# Patient Record
Sex: FEMALE | Race: WHITE | NOT HISPANIC OR LATINO | Employment: FULL TIME | URBAN - METROPOLITAN AREA
[De-identification: names, ages, dates, MRNs, and addresses within clinical notes are randomized per-mention and may not be internally consistent; named-entity substitution may affect disease eponyms.]

---

## 2019-03-13 ENCOUNTER — HOSPITAL ENCOUNTER (OUTPATIENT)
Dept: GENERAL RADIOLOGY | Facility: HOSPITAL | Age: 44
Discharge: HOME | End: 2019-03-13
Attending: NURSE PRACTITIONER

## 2021-03-10 ENCOUNTER — TELEMEDICINE CONVERTED (OUTPATIENT)
Dept: NEUROLOGY | Facility: CLINIC | Age: 46
End: 2021-03-10
Attending: NURSE PRACTITIONER

## 2021-05-10 NOTE — H&P
History and Physical      Patient Name: Marybeth Harrison   Patient ID: [id number]   Sex: Female   YOB: 1975    Referring Provider: Sully HARDY    Visit Date: March 10, 2021    Provider: PADMINI Borjas   Location: [facility]   Location Address: [address] [place] [address]  [id number]   Location Phone: [phone]          Chief Complaint  · Migraines      History Of Present Illness  Marybeth Harrison is a 46 year old /White female who presents today to [facility] today referred from Sully HARDY for evaluation of headaches.   She reports that she developed headaches many years ago. Since that time, her headaches have progressively worsened.   Currently, she reports headaches that are located holocranial. She characterizes the headaches as 8/10 in severity, throbbing in nature with associated photophobia, phonophobia, and nausea. Her headaches last 8-10 hours. She reports 4 headache days per month. She denies associated aura. She denies focal numbness, weakness, speech, and vision changes.   Triggers: Weather   Symptoms improved by: Dark quiet room and Sleep   She states she is sleeping fairly well. Reports getting 6-7 hours of sleep per night. Denies snoring. Reports refreshing sleep.   Prior prophylactic medications include: Trokendi XR, Viibryd, amitriptyline, Inderal, Emgality   She uses abortive therapy such as: Imitrex, Maxalt, Zomig, ibuprofen, aleve, tylenol   Caffeine Use: none   Independently Reviewed: INDEPENDENTLY REVIEWED WHAT   Childbearing potential : menopausal   History of Kidney Stones: No   She denies a family history of cerebral aneurysm.   Video Conferencing Visit  Marybeth Harrison is a 46 year old /White female who is presenting for evaluation via video conferencing via [employer]. Verbal consent obtained before beginning visit.   The following staff were present during this visit: LORY Napoles       Prior to Emgality samples from PCP, was having 25+ migraine days per month.  Has had a very significant reduction with Emgality.       Past Medical History  miagraine; Panic Disorder         Past Surgical History  Colonoscopy; Septoplasty; Turbinate Reduction         Medication List  clonazepam 0.5 mg Oral Tablet; Viibryd 10 mg oral tablet; Zomig 5 mg oral tablet         Allergy List  NO KNOWN DRUG ALLERGIES       Allergies Reconciled  Family Medical History  - No Family History of Colorectal Cancer         Social History  Alcohol (Former); Tobacco (Never)         Review of Systems  · Constitutional  o Denies  o : chills, excessive sweating, fatigue, fever, sycope/passing out, weight gain, weight loss  · Eyes  o Denies  o : changes in vision, blurry vision, double vision  · HENT  o Admits  o : headaches  o Denies  o : loss of hearing, ringing in the ears, ear aches, sore throat, nasal congestion, sinus pain, nose bleeds, seasonal allergies  · Cardiovascular  o Denies  o : blood clots, swollen legs, anemia, easy burising or bleeding, transfusions  · Respiratory  o Denies  o : shortness of breath, dry cough, productive cough, pneumonia, COPD  · Gastrointestinal  o Denies  o : difficulty swallowing, reflux  · Genitourinary  o Denies  o : incontinence  · Neurologic  o Admits  o : headache  o Denies  o : seizure, stroke, tremor, loss of balance, falls, dizziness/vertigo, difficulty with sleep, numbness/tingling/paresthesia , difficulty with coordination, difficulty with dexterity, weakness  · Musculoskeletal  o Denies  o : neck stiffness/pain, swollen lymph nodes, muscle aches, joint pain, weakness, spasms, sciatica, pain radiating in arm, pain radiating in leg, low back pain  · Endocrine  o Denies  o : diabetes, thyroid disorder  · Psychiatric  o Denies  o : anxiety, depression      Physical Examination  · Constitutional  o Appearance  o : well-nourished, well groomed, in no apparent distress  · Eyes  o Pupils and  Irises  o : Pupils equal, round, and reactive to light and accommodation bilaterally  · Respiratory  o Auscultation of Lungs  o : Lungs were clear to ascultation bilaterally. No wheezes, rhonchi or rales were appreciated.  · Cardiovascular  o Heart  o :   § Auscultation of Heart  § : Regular rate and rhythm, no murmurs, gallops or rubs were appreciated.  o Peripheral Vascular System  o :   § Extremities  § : No peripheral edema was appreciated  · Musculoskeletal  o General  o : Normal bulk and normal tone.  · Neurologic  o Mental Status Examination  o :   § Orientation  § : Alert and oriented to person, place, and time,  § Speech/Language  § : Intact naming, comprehension, and repetition. No dysarthria.  § Memory  § : Immediate recall is 3/3. Recall at 5 minutes is 3/3.   § Attention  § : Attention span is intact to serial 7 examination and finger tapping.   § Fund of Knowledge  § : Adequate fund of knowledge  o Cranial Nerves  o : Pupils are equal, round and reactive to light. Extraocular movements are intact. Visual fields are full. Fundoscopic examination reveals sharp disc bilaterally. Sensation in the V1-V3 distribution is intact and symmetric. Muscles of mastication are strong and symmetric. Muscles of facial expression are strong and symmetric. Hearing is intact. Palatal raise is intact and symmetric. Uvula is midline. Shoulder shrug is strong. Tongue protrudes in the midline.  o Motor Examination  o :   § RUE Strength  § : strength normal  § LUE Strength  § : strength normal  § RLE Strength  § : strength normal  § LLE Strength  § : strength normal  o Reflexes  o : 2+ reflexes throughout and symmetric. Negative Hall. Negative Babinski.   o Sensation  o : Intact sensation to light touch, pinprick, vibration and proprioception throughout.  o Gait and Station  o :   § Gait Screening  § : Normal, narrow based gait, with a normal arm swing.  o Coordination  o : Intact finger to nose and heel to shin. Rapid  alternating movements are intact in the upper and lower extremities.          Assessment  · Intractable migraine without aura and without status migrainosus     346.11/G43.019  Will continue Emgality for preventative therapy of migraine and Nurtec PRN for abortive therapy.       Plan  · Medications  o Emgality Pen 120 mg/mL subcutaneous pen injector   SIG: inject 1 milliliter (120 mg) by subcutaneous route once a month in the abdomen, thigh, outer upper arm, or buttocks for 30 days   DISP: (1) Milliliter with 5 refills  Prescribed on 03/10/2021     o Nurtec ODT 75 mg oral tablet,disintegrating   SIG: Dissolve 1 tablet on top of tongue then swallow. Max 1 tablet/day   DISP: (8) Tablet with 3 refills  Prescribed on 03/10/2021     o Medications have been Reconciled  o Transition of Care or Provider Policy  · Instructions  o Encouraged to follow-up with Primary Care Provider for preventative care.  o Call or Return if symptoms worsen or persist.  o Follow up in 3 months.  o Total time spent with patient and coordinating patient care was 45 minutes  · Disposition  o Call or Return if symptoms worsen or persist.            Electronically Signed by: Kassandra Moyer APRN -Author on March 10, 2021 03:22:32 PM

## 2021-06-10 ENCOUNTER — OFFICE VISIT (OUTPATIENT)
Dept: NEUROLOGY | Facility: CLINIC | Age: 46
End: 2021-06-10

## 2021-06-10 VITALS
HEIGHT: 63 IN | SYSTOLIC BLOOD PRESSURE: 121 MMHG | DIASTOLIC BLOOD PRESSURE: 79 MMHG | TEMPERATURE: 97.8 F | HEART RATE: 88 BPM | WEIGHT: 145.5 LBS | BODY MASS INDEX: 25.78 KG/M2

## 2021-06-10 DIAGNOSIS — G43.019 INTRACTABLE MIGRAINE WITHOUT AURA AND WITHOUT STATUS MIGRAINOSUS: Primary | ICD-10-CM

## 2021-06-10 PROCEDURE — 99214 OFFICE O/P EST MOD 30 MIN: CPT | Performed by: NURSE PRACTITIONER

## 2021-06-10 RX ORDER — RIMEGEPANT SULFATE 75 MG/75MG
TABLET, ORALLY DISINTEGRATING ORAL
COMMUNITY
Start: 2021-03-10 | End: 2021-06-10

## 2021-06-10 RX ORDER — GALCANEZUMAB 120 MG/ML
INJECTION, SOLUTION SUBCUTANEOUS
COMMUNITY
Start: 2021-05-09 | End: 2021-06-10 | Stop reason: SDUPTHER

## 2021-06-10 RX ORDER — GALCANEZUMAB 120 MG/ML
120 INJECTION, SOLUTION SUBCUTANEOUS ONCE
Qty: 1 PEN | Refills: 3 | Status: SHIPPED | OUTPATIENT
Start: 2021-06-10 | End: 2021-06-10

## 2021-06-10 RX ORDER — ZOLMITRIPTAN 5 MG/1
SPRAY NASAL
COMMUNITY
Start: 2021-05-25 | End: 2021-06-10 | Stop reason: SDUPTHER

## 2021-06-10 RX ORDER — ZOLMITRIPTAN 5 MG/1
1 SPRAY NASAL AS NEEDED
Qty: 12 EACH | Refills: 3 | Status: SHIPPED | OUTPATIENT
Start: 2021-06-10 | End: 2021-10-12 | Stop reason: SDUPTHER

## 2021-06-10 RX ORDER — VILAZODONE HYDROCHLORIDE 10 MG/1
20 TABLET ORAL DAILY
COMMUNITY

## 2021-06-10 RX ORDER — CLONAZEPAM 1 MG/1
1 TABLET ORAL 2 TIMES DAILY PRN
COMMUNITY
Start: 2021-04-24

## 2021-06-10 NOTE — ASSESSMENT & PLAN NOTE
Will continue Emgality for preventative therapy of migraine.  Continue Zomig and start Ubrelvy for abortive therapy of migraine.

## 2021-06-10 NOTE — PROGRESS NOTES
"Chief Complaint  Migraine and Follow-up    Subjective          Marybeth Harrison presents to [facility]  States she's doing very well on Emgality for preventative therapy of migraine.  Having less than 4 migraine days per month.  Denies side effects.  However, Nurtec isn't effective abortive therapy.  States that Zomig is effective as an abortive therapy but causes her drowsiness and she is not comfortable taking if she is at work.        Objective   Vital Signs:   /79 (BP Location: Left arm, Patient Position: Sitting)   Pulse 88   Temp 97.8 °F (36.6 °C)   Ht 160 cm (63\")   Wt 66 kg (145 lb 8 oz)   BMI 25.77 kg/m²     Physical Exam  HENT:      Head: Normocephalic.   Pulmonary:      Effort: Pulmonary effort is normal.   Neurological:      Mental Status: She is alert and oriented to person, place, and time.      Cranial Nerves: Cranial nerves are intact.      Sensory: Sensation is intact.      Motor: Motor function is intact.      Coordination: Coordination is intact.      Deep Tendon Reflexes: Reflexes are normal and symmetric.        Neurologic Exam     Mental Status   Oriented to person, place, and time.        Result Review :                 Assessment and Plan    Diagnoses and all orders for this visit:    1. Intractable migraine without aura and without status migrainosus (Primary)  Assessment & Plan:  Will continue Emgality for preventative therapy of migraine.  Continue Zomig and start Ubrelvy for abortive therapy of migraine.         Other orders  -     Emgality 120 MG/ML auto-injector pen; Inject 1 mL under the skin into the appropriate area as directed 1 (One) Time for 1 dose.  Dispense: 1 pen; Refill: 3  -     ZOLMitriptan (ZOMIG) 5 MG nasal solution; 1 spray into the nostril(s) as directed by provider As Needed for Migraine.  Dispense: 12 each; Refill: 3  -     ubrogepant (ubrogepant) 100 MG tablet; One tab at HA onset. May repeat once in 2 hrs if " needed  Dispense: 10 tablet; Refill: 3      Follow Up   Return in about 4 months (around 10/10/2021) for migraine.  Patient was given instructions and counseling regarding her condition or for health maintenance advice. Please see specific information pulled into the AVS if appropriate.

## 2021-10-12 ENCOUNTER — OFFICE VISIT (OUTPATIENT)
Dept: NEUROLOGY | Facility: CLINIC | Age: 46
End: 2021-10-12

## 2021-10-12 VITALS
BODY MASS INDEX: 26.26 KG/M2 | HEIGHT: 63 IN | DIASTOLIC BLOOD PRESSURE: 84 MMHG | HEART RATE: 108 BPM | SYSTOLIC BLOOD PRESSURE: 133 MMHG | WEIGHT: 148.2 LBS

## 2021-10-12 DIAGNOSIS — G43.019 INTRACTABLE MIGRAINE WITHOUT AURA AND WITHOUT STATUS MIGRAINOSUS: Primary | ICD-10-CM

## 2021-10-12 PROCEDURE — 99213 OFFICE O/P EST LOW 20 MIN: CPT | Performed by: NURSE PRACTITIONER

## 2021-10-12 RX ORDER — GALCANEZUMAB 120 MG/ML
120 INJECTION, SOLUTION SUBCUTANEOUS ONCE
Qty: 1 PEN | Refills: 3 | Status: SHIPPED | OUTPATIENT
Start: 2021-10-12 | End: 2021-10-12

## 2021-10-12 RX ORDER — ZOLMITRIPTAN 5 MG/1
1 SPRAY NASAL AS NEEDED
Qty: 12 EACH | Refills: 3 | Status: SHIPPED | OUTPATIENT
Start: 2021-10-12 | End: 2022-02-15 | Stop reason: SDUPTHER

## 2021-10-12 NOTE — PROGRESS NOTES
"Chief Complaint  Migraine (emgality)    Subjective          Marybeth Harrison presents to [facility]ERY  Headaches have decreased to about 6 headache days per month with the Emgality.  Zomig is effective abortive therapy.  Doing well.  Denies side effect.       Objective   Vital Signs:   /84   Pulse 108   Ht 160 cm (63\")   Wt 67.2 kg (148 lb 3.2 oz)   BMI 26.25 kg/m²     Physical Exam  HENT:      Head: Normocephalic.   Pulmonary:      Effort: Pulmonary effort is normal.   Neurological:      Mental Status: She is alert and oriented to person, place, and time.      Cranial Nerves: Cranial nerves are intact.      Sensory: Sensation is intact.      Motor: Motor function is intact.      Coordination: Coordination is intact.      Deep Tendon Reflexes: Reflexes are normal and symmetric.        Neurologic Exam     Mental Status   Oriented to person, place, and time.        Result Review :               Assessment and Plan    Diagnoses and all orders for this visit:    1. Intractable migraine without aura and without status migrainosus (Primary)  Assessment & Plan:  Continue emgality for preventative therapy of migraine and Zomig PRN for abortive therapy.         Other orders  -     ZOLMitriptan (ZOMIG) 5 MG nasal solution; 1 spray into the nostril(s) as directed by provider As Needed for Migraine.  Dispense: 12 each; Refill: 3  -     galcanezumab-gnlm (Emgality) 120 MG/ML auto-injector pen; Inject 1 mL under the skin into the appropriate area as directed 1 (One) Time for 1 dose.  Dispense: 1 pen; Refill: 3      Follow Up   Return in about 4 months (around 2/12/2022) for Migraine f/u.  Patient was given instructions and counseling regarding her condition or for health maintenance advice. Please see specific information pulled into the AVS if appropriate.       "

## 2022-01-10 ENCOUNTER — HOSPITAL ENCOUNTER (EMERGENCY)
Facility: HOSPITAL | Age: 47
Discharge: HOME | End: 2022-01-10
Attending: EMERGENCY MEDICINE | Admitting: EMERGENCY MEDICINE

## 2022-01-10 VITALS
TEMPERATURE: 98.3 F | WEIGHT: 147.49 LBS | BODY MASS INDEX: 27.14 KG/M2 | HEART RATE: 97 BPM | OXYGEN SATURATION: 97 % | HEIGHT: 62 IN | DIASTOLIC BLOOD PRESSURE: 97 MMHG | RESPIRATION RATE: 18 BRPM | SYSTOLIC BLOOD PRESSURE: 134 MMHG

## 2022-01-10 DIAGNOSIS — I10 PRIMARY HYPERTENSION: Primary | ICD-10-CM

## 2022-01-10 LAB
ALBUMIN SERPL-MCNC: 4.9 G/DL (ref 3.5–5.2)
ALBUMIN/GLOB SERPL: 1.5 G/DL
ALP SERPL-CCNC: 105 U/L (ref 39–117)
ALT SERPL W P-5'-P-CCNC: 10 U/L (ref 1–33)
ANION GAP SERPL CALCULATED.3IONS-SCNC: 10.2 MMOL/L (ref 5–15)
AST SERPL-CCNC: 16 U/L (ref 1–32)
BASOPHILS # BLD AUTO: 0.05 10*3/MM3 (ref 0–0.2)
BASOPHILS NFR BLD AUTO: 0.5 % (ref 0–1.5)
BILIRUB SERPL-MCNC: 0.3 MG/DL (ref 0–1.2)
BUN SERPL-MCNC: 10 MG/DL (ref 6–20)
BUN/CREAT SERPL: 12.7 (ref 7–25)
CALCIUM SPEC-SCNC: 9.5 MG/DL (ref 8.6–10.5)
CHLORIDE SERPL-SCNC: 99 MMOL/L (ref 98–107)
CO2 SERPL-SCNC: 25.8 MMOL/L (ref 22–29)
CREAT SERPL-MCNC: 0.79 MG/DL (ref 0.57–1)
D DIMER PPP FEU-MCNC: <0.17 MG/L (FEU) (ref 0–0.59)
DEPRECATED RDW RBC AUTO: 43.8 FL (ref 37–54)
EOSINOPHIL # BLD AUTO: 0 10*3/MM3 (ref 0–0.4)
EOSINOPHIL NFR BLD AUTO: 0 % (ref 0.3–6.2)
ERYTHROCYTE [DISTWIDTH] IN BLOOD BY AUTOMATED COUNT: 13.9 % (ref 12.3–15.4)
GFR SERPL CREATININE-BSD FRML MDRD: 79 ML/MIN/1.73
GLOBULIN UR ELPH-MCNC: 3.2 GM/DL
GLUCOSE SERPL-MCNC: 117 MG/DL (ref 65–99)
HCG INTACT+B SERPL-ACNC: <0.5 MIU/ML
HCT VFR BLD AUTO: 41.6 % (ref 34–46.6)
HGB BLD-MCNC: 13.4 G/DL (ref 12–15.9)
HOLD SPECIMEN: NORMAL
HOLD SPECIMEN: NORMAL
IMM GRANULOCYTES # BLD AUTO: 0.03 10*3/MM3 (ref 0–0.05)
IMM GRANULOCYTES NFR BLD AUTO: 0.3 % (ref 0–0.5)
LYMPHOCYTES # BLD AUTO: 1.26 10*3/MM3 (ref 0.7–3.1)
LYMPHOCYTES NFR BLD AUTO: 12.4 % (ref 19.6–45.3)
MCH RBC QN AUTO: 28 PG (ref 26.6–33)
MCHC RBC AUTO-ENTMCNC: 32.2 G/DL (ref 31.5–35.7)
MCV RBC AUTO: 86.8 FL (ref 79–97)
MONOCYTES # BLD AUTO: 0.32 10*3/MM3 (ref 0.1–0.9)
MONOCYTES NFR BLD AUTO: 3.1 % (ref 5–12)
NEUTROPHILS NFR BLD AUTO: 8.51 10*3/MM3 (ref 1.7–7)
NEUTROPHILS NFR BLD AUTO: 83.7 % (ref 42.7–76)
NRBC BLD AUTO-RTO: 0 /100 WBC (ref 0–0.2)
PLATELET # BLD AUTO: 276 10*3/MM3 (ref 140–450)
PMV BLD AUTO: 10.1 FL (ref 6–12)
POTASSIUM SERPL-SCNC: 3.8 MMOL/L (ref 3.5–5.2)
PROT SERPL-MCNC: 8.1 G/DL (ref 6–8.5)
RBC # BLD AUTO: 4.79 10*6/MM3 (ref 3.77–5.28)
SODIUM SERPL-SCNC: 135 MMOL/L (ref 136–145)
TSH SERPL DL<=0.05 MIU/L-ACNC: 1.22 UIU/ML (ref 0.27–4.2)
WBC NRBC COR # BLD: 10.17 10*3/MM3 (ref 3.4–10.8)
WHOLE BLOOD HOLD SPECIMEN: NORMAL
WHOLE BLOOD HOLD SPECIMEN: NORMAL

## 2022-01-10 PROCEDURE — 99283 EMERGENCY DEPT VISIT LOW MDM: CPT

## 2022-01-10 PROCEDURE — 36415 COLL VENOUS BLD VENIPUNCTURE: CPT | Performed by: EMERGENCY MEDICINE

## 2022-01-10 PROCEDURE — 85379 FIBRIN DEGRADATION QUANT: CPT | Performed by: NURSE PRACTITIONER

## 2022-01-10 PROCEDURE — U0004 COV-19 TEST NON-CDC HGH THRU: HCPCS | Performed by: NURSE PRACTITIONER

## 2022-01-10 PROCEDURE — 80050 GENERAL HEALTH PANEL: CPT | Performed by: NURSE PRACTITIONER

## 2022-01-10 PROCEDURE — 93010 ELECTROCARDIOGRAM REPORT: CPT | Performed by: INTERNAL MEDICINE

## 2022-01-10 PROCEDURE — 84702 CHORIONIC GONADOTROPIN TEST: CPT

## 2022-01-10 PROCEDURE — C9803 HOPD COVID-19 SPEC COLLECT: HCPCS

## 2022-01-10 PROCEDURE — 93005 ELECTROCARDIOGRAM TRACING: CPT | Performed by: NURSE PRACTITIONER

## 2022-01-10 RX ORDER — CLONIDINE HYDROCHLORIDE 0.1 MG/1
0.1 TABLET ORAL 2 TIMES DAILY
Qty: 30 TABLET | Refills: 0 | Status: SHIPPED | OUTPATIENT
Start: 2022-01-10 | End: 2022-08-15

## 2022-01-10 NOTE — ED PROVIDER NOTES
Lisbeth Rueda is a 46-year-old female who presents to the emergency department today for complaints of nausea, her face feeling hot, SOA, intermittent dizziness x 2 days and hypertension.  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  He denies any chest pain, weakness, numbness, tingling or any other complaints today.          Review of Systems   Gastrointestinal: Positive for nausea.   Neurological: Positive for light-headedness.   All other systems reviewed and are negative.      Past Medical History:   Diagnosis Date   • Insomnia    • Migraines    • Panic disorder        Allergies   Allergen Reactions   • Trazodone Other (See Comments)       Past Surgical History:   Procedure Laterality Date   • COLONOSCOPY  2016   • NASAL TURBINATE REDUCTION     • SEPTOPLASTY         Family History   Problem Relation Age of Onset   • Colon cancer Neg Hx        Social History     Socioeconomic History   • Marital status:    Tobacco Use   • Smoking status: Never Smoker   • Smokeless tobacco: Never Used   Vaping Use   • Vaping Use: Never used   Substance and Sexual Activity   • Alcohol use: Yes     Comment: SOCIALLY   • Drug use: Defer           Objective   Physical Exam  Vitals and nursing note reviewed.   Constitutional:       General: She is not in acute distress.     Appearance: Normal appearance. She is normal weight. She is not ill-appearing, toxic-appearing or diaphoretic.   HENT:      Right Ear: Tympanic membrane normal.      Left Ear: Tympanic membrane normal.   Eyes:      Extraocular Movements: Extraocular movements intact.      Pupils: Pupils are equal, round, and reactive to light.   Cardiovascular:      Rate and Rhythm: Normal rate and regular rhythm.      Pulses: Normal pulses.      Heart sounds: Normal heart sounds.   Pulmonary:      Effort: Pulmonary effort is normal.      Breath sounds: Normal breath sounds.   Abdominal:      General: Abdomen is flat. Bowel sounds are normal.      Palpations: Abdomen is  soft.   Musculoskeletal:         General: Normal range of motion.      Cervical back: Normal range of motion and neck supple.   Skin:     General: Skin is warm and dry.      Capillary Refill: Capillary refill takes less than 2 seconds.   Neurological:      General: No focal deficit present.      Mental Status: She is alert and oriented to person, place, and time. Mental status is at baseline.      Cranial Nerves: No cranial nerve deficit.      Sensory: No sensory deficit.      Motor: No weakness.      Coordination: Coordination normal.      Gait: Gait normal.   Psychiatric:         Mood and Affect: Mood normal.         Behavior: Behavior normal.         Procedures           ED Course                                                 MDM  Number of Diagnoses or Management Options  Diagnosis management comments: Seen and assessed patient as noted.  Vital stable, no acute distress, afebrile.      Differentials include but are not limited to:  covid-19, viral syndrome, CVA, MI, TIA, electrolyte abnormality, infection, anemia, other etiology.    Full work-up today shows no emergent findings.  Patient is neurologically intact with sensation intact no focal deficits noted.  Patient is PERC negative so I am not concerned for PE.  Educated patient on keeping a blood pressure record to follow-up with her cardiologist or PCP.  Prescribing clonidine with the parameters of holding should blood pressure be less than 160/90.  Educated patient on worrisome symptoms to follow-up for and she verbalized understanding.  I feel patient is safe to discharge home as her blood pressure is controlled here at 123/60.       Amount and/or Complexity of Data Reviewed  Clinical lab tests: reviewed and ordered  Tests in the medicine section of CPT®: reviewed    Risk of Complications, Morbidity, and/or Mortality  Presenting problems: moderate  Diagnostic procedures: low  Management options: low    Patient Progress  Patient progress:  stable      Final diagnoses:   Primary hypertension       ED Disposition  ED Disposition     ED Disposition Condition Comment    Discharge Stable           Kamran Ortega MD  [address]  [place] [address]  [phone]    Go in 1 day           Medication List      New Prescriptions    cloNIDine 0.1 MG tablet  Commonly known as: CATAPRES  Take 1 tablet by mouth 2 (Two) Times a Day. Hold if BP <160/90           Where to Get Your Medications      These medications were sent to [facility]/pharmacy #[id number] - [place] - [address] - [phone]  - [phone] FX  [address] [place] [address]    Hours: 24-hours Phone: [phone]   · cloNIDine 0.1 MG tablet          Promise Kendall, APRN  01/10/22 1822       Promise Kendall, APRN  01/10/22 1823       Promise Kendall, APRN  01/10/22 3888

## 2022-01-11 LAB
QT INTERVAL: 340 MS
SARS-COV-2 RNA PNL SPEC NAA+PROBE: NOT DETECTED

## 2022-02-15 ENCOUNTER — OFFICE VISIT (OUTPATIENT)
Dept: NEUROLOGY | Facility: CLINIC | Age: 47
End: 2022-02-15

## 2022-02-15 VITALS
HEIGHT: 63 IN | HEART RATE: 106 BPM | WEIGHT: 143.7 LBS | SYSTOLIC BLOOD PRESSURE: 123 MMHG | DIASTOLIC BLOOD PRESSURE: 88 MMHG | BODY MASS INDEX: 25.46 KG/M2

## 2022-02-15 DIAGNOSIS — G43.019 INTRACTABLE MIGRAINE WITHOUT AURA AND WITHOUT STATUS MIGRAINOSUS: Primary | ICD-10-CM

## 2022-02-15 PROCEDURE — 99213 OFFICE O/P EST LOW 20 MIN: CPT | Performed by: NURSE PRACTITIONER

## 2022-02-15 RX ORDER — AMITRIPTYLINE HYDROCHLORIDE 25 MG/1
50 TABLET, FILM COATED ORAL DAILY
COMMUNITY

## 2022-02-15 RX ORDER — GALCANEZUMAB 120 MG/ML
INJECTION, SOLUTION SUBCUTANEOUS
COMMUNITY
End: 2022-02-15 | Stop reason: SDUPTHER

## 2022-02-15 RX ORDER — ZOLMITRIPTAN 5 MG/1
1 SPRAY NASAL AS NEEDED
Qty: 12 EACH | Refills: 5 | Status: SHIPPED | OUTPATIENT
Start: 2022-02-15 | End: 2022-08-15 | Stop reason: SDUPTHER

## 2022-02-15 RX ORDER — GALCANEZUMAB 120 MG/ML
120 INJECTION, SOLUTION SUBCUTANEOUS ONCE
Qty: 1.12 ML | Refills: 5 | Status: SHIPPED | OUTPATIENT
Start: 2022-02-15 | End: 2022-02-15

## 2022-02-15 NOTE — PROGRESS NOTES
"Chief Complaint  Migraine (emgality/zomig nasal)    Subjective          Marybeth Harrison presents to [facility] NEUROSUR[facility]ERY  Headaches have decreased to about 6 headache days per month with the Emgality.  Zomig is effective abortive therapy.  Doing well.  Denies side effect.       Objective   Vital Signs:   /88   Pulse 106   Ht 160 cm (63\")   Wt 65.2 kg (143 lb 11.2 oz)   BMI 25.46 kg/m²     Physical Exam  HENT:      Head: Normocephalic.   Pulmonary:      Effort: Pulmonary effort is normal.   Neurological:      Mental Status: She is alert and oriented to person, place, and time.      Cranial Nerves: Cranial nerves are intact.      Sensory: Sensation is intact.      Motor: Motor function is intact.      Coordination: Coordination is intact.      Deep Tendon Reflexes: Reflexes are normal and symmetric.        Neurologic Exam     Mental Status   Oriented to person, place, and time.        Result Review :               Assessment and Plan    Diagnoses and all orders for this visit:    1. Intractable migraine without aura and without status migrainosus (Primary)  Assessment & Plan:  Continue emgality for preventative therapy of migraine and Zomig PRN for abortive therapy.           Follow Up   Return in about 6 months (around 8/15/2022) for Migraine f/u.  Patient was given instructions and counseling regarding her condition or for health maintenance advice. Please see specific information pulled into the AVS if appropriate.       "

## 2022-08-10 RX ORDER — GALCANEZUMAB 120 MG/ML
INJECTION, SOLUTION SUBCUTANEOUS
Qty: 1 EACH | OUTPATIENT
Start: 2022-08-10

## 2022-08-10 NOTE — TELEPHONE ENCOUNTER
Refill denied. Refill for 30 day supply +5 refills sent in on 02/15/2022 and f/u is on 08/15/2022. Pt should have enough to last until then and can call if she will be out before then.

## 2022-08-15 ENCOUNTER — OFFICE VISIT (OUTPATIENT)
Dept: NEUROLOGY | Facility: CLINIC | Age: 47
End: 2022-08-15

## 2022-08-15 VITALS
DIASTOLIC BLOOD PRESSURE: 86 MMHG | HEIGHT: 63 IN | WEIGHT: 132 LBS | HEART RATE: 107 BPM | SYSTOLIC BLOOD PRESSURE: 143 MMHG | BODY MASS INDEX: 23.39 KG/M2

## 2022-08-15 DIAGNOSIS — G43.019 INTRACTABLE MIGRAINE WITHOUT AURA AND WITHOUT STATUS MIGRAINOSUS: Primary | ICD-10-CM

## 2022-08-15 PROCEDURE — 99214 OFFICE O/P EST MOD 30 MIN: CPT | Performed by: NURSE PRACTITIONER

## 2022-08-15 RX ORDER — SUMATRIPTAN 20 MG/1
SPRAY NASAL
COMMUNITY
End: 2023-02-15

## 2022-08-15 RX ORDER — GALCANEZUMAB 120 MG/ML
120 INJECTION, SOLUTION SUBCUTANEOUS ONCE
Qty: 3 ML | Refills: 1 | Status: SHIPPED | OUTPATIENT
Start: 2022-08-15 | End: 2022-08-15

## 2022-08-15 RX ORDER — AMITRIPTYLINE HYDROCHLORIDE 50 MG/1
TABLET, FILM COATED ORAL
COMMUNITY

## 2022-08-15 RX ORDER — ZOLMITRIPTAN 5 MG/1
1 SPRAY NASAL AS NEEDED
Qty: 12 EACH | Refills: 5 | Status: SHIPPED | OUTPATIENT
Start: 2022-08-15 | End: 2023-02-15

## 2022-08-15 RX ORDER — GALCANEZUMAB 120 MG/ML
INJECTION, SOLUTION SUBCUTANEOUS
COMMUNITY
End: 2022-08-15 | Stop reason: SDUPTHER

## 2022-08-15 NOTE — PROGRESS NOTES
"Chief Complaint  Follow-up    Subjective          Marybeth Harrison presents to [facility] NEUROSURGERY  Headaches have decreased to about 6 headache days per month with the Emgality.  Sumatriptan nasal spray is not effective abortive therapy.  Doing well.  Denies side effect.       Objective   Vital Signs:   /86   Pulse 107   Ht 160 cm (63\")   Wt 59.9 kg (132 lb)   BMI 23.38 kg/m²     Physical Exam  HENT:      Head: Normocephalic.   Pulmonary:      Effort: Pulmonary effort is normal.   Neurological:      Mental Status: She is alert and oriented to person, place, and time.      Cranial Nerves: Cranial nerves are intact.      Sensory: Sensation is intact.      Motor: Motor function is intact.      Coordination: Coordination is intact.      Deep Tendon Reflexes: Reflexes are normal and symmetric.        Neurologic Exam     Mental Status   Oriented to person, place, and time.        Result Review :               Assessment and Plan    Diagnoses and all orders for this visit:    1. Intractable migraine without aura and without status migrainosus (Primary)  Assessment & Plan:  Will continue Emgality for preventative therapy and restart Zomig NS PRN for abortive therapy.         Other orders  -     galcanezumab-gnlm (Emgality) 120 MG/ML auto-injector pen; Inject 1 mL under the skin into the appropriate area as directed 1 (One) Time for 1 dose.  Dispense: 3 mL; Refill: 1  -     ZOLMitriptan (ZOMIG) 5 MG nasal solution; 1 spray into the nostril(s) as directed by provider As Needed for Migraine.  Dispense: 12 each; Refill: 5      Follow Up   Return in about 6 months (around 2/15/2023) for Migraine f/u.  Patient was given instructions and counseling regarding her condition or for health maintenance advice. Please see specific information pulled into the AVS if appropriate.       "

## 2022-11-01 ENCOUNTER — TRANSCRIBE ORDERS (OUTPATIENT)
Dept: ADMINISTRATIVE | Facility: HOSPITAL | Age: 47
End: 2022-11-01

## 2022-11-01 DIAGNOSIS — Z12.39 OTHER SCREENING BREAST EXAMINATION: Primary | ICD-10-CM

## 2022-12-09 ENCOUNTER — TELEPHONE (OUTPATIENT)
Dept: SURGERY | Facility: CLINIC | Age: 47
End: 2022-12-09

## 2022-12-09 NOTE — TELEPHONE ENCOUNTER
SPOKE TO BABAK AT BABAK PATRICIA'S OFFICE TO INFORM PT CX NEW PT APPT WITH Brenda VAZQUEZ FROM 12/8/MS

## 2022-12-13 ENCOUNTER — PATIENT MESSAGE (OUTPATIENT)
Dept: NEUROLOGY | Facility: CLINIC | Age: 47
End: 2022-12-13

## 2023-02-15 ENCOUNTER — TELEMEDICINE (OUTPATIENT)
Dept: NEUROLOGY | Facility: CLINIC | Age: 48
End: 2023-02-15
Payer: COMMERCIAL

## 2023-02-15 DIAGNOSIS — G43.019 INTRACTABLE MIGRAINE WITHOUT AURA AND WITHOUT STATUS MIGRAINOSUS: Primary | ICD-10-CM

## 2023-02-15 PROCEDURE — 99214 OFFICE O/P EST MOD 30 MIN: CPT | Performed by: NURSE PRACTITIONER

## 2023-02-15 RX ORDER — ZOLMITRIPTAN 5 MG/1
1 SPRAY NASAL AS NEEDED
Qty: 9 EACH | Refills: 5 | Status: SHIPPED | OUTPATIENT
Start: 2023-02-15 | End: 2023-04-03 | Stop reason: SDUPTHER

## 2023-02-15 RX ORDER — GALCANEZUMAB 120 MG/ML
120 INJECTION, SOLUTION SUBCUTANEOUS
Qty: 3 EACH | Refills: 1 | Status: SHIPPED | OUTPATIENT
Start: 2023-02-15

## 2023-02-15 NOTE — PROGRESS NOTES
Chief Complaint  Migraine    Subjective          Marybeth Harrison presents to [facility] NEUROLOGY & NEUROSURGERY  History of Present Illness  Headaches have decreased to about 6 headache days per month with the Emgality.  Doing well.  Denies side effect. Sumatriptan NS is not as effective as Zomig NS.  Wishing to go back to Zomig NS.       Objective   Vital Signs:   There were no vitals taken for this visit.    Physical Exam  HENT:      Head: Normocephalic.   Pulmonary:      Effort: Pulmonary effort is normal.   Neurological:      Mental Status: She is alert and oriented to person, place, and time.      Sensory: Sensation is intact.      Motor: Motor function is intact.      Coordination: Coordination is intact.      Deep Tendon Reflexes: Reflexes are normal and symmetric.        Neurologic Exam     Mental Status   Oriented to person, place, and time.        Result Review :               Assessment and Plan    Diagnoses and all orders for this visit:    1. Intractable migraine without aura and without status migrainosus (Primary)  Assessment & Plan:  Will continue Emgality for preventative therapy and restart Zomig NS PRN for abortive therapy.         Other orders  -     ZOLMitriptan (ZOMIG) 5 MG nasal solution; 1 spray into the nostril(s) as directed by provider As Needed for Migraine. 1 spray at HA onset, may repeat once in 1 hour if needed  Dispense: 9 each; Refill: 5  -     galcanezumab-gnlm (Emgality) 120 MG/ML auto-injector pen; Inject 1 mL under the skin into the appropriate area as directed Every 30 (Thirty) Days.  Dispense: 3 each; Refill: 1      Follow Up   Return in about 6 months (around 8/15/2023) for Migraine f/u.  Patient was given instructions and counseling regarding her condition or for health maintenance advice. Please see specific information pulled into the AVS if appropriate.

## 2023-02-16 RX ORDER — GALCANEZUMAB 120 MG/ML
120 INJECTION, SOLUTION SUBCUTANEOUS ONCE
Qty: 3 EACH | Refills: 1 | OUTPATIENT
Start: 2023-02-16 | End: 2023-02-16

## 2023-02-20 ENCOUNTER — PRIOR AUTHORIZATION (OUTPATIENT)
Dept: NEUROLOGY | Facility: CLINIC | Age: 48
End: 2023-02-20
Payer: COMMERCIAL

## 2023-03-06 ENCOUNTER — PATIENT MESSAGE (OUTPATIENT)
Dept: NEUROLOGY | Facility: CLINIC | Age: 48
End: 2023-03-06
Payer: COMMERCIAL

## 2023-04-01 ENCOUNTER — PATIENT MESSAGE (OUTPATIENT)
Dept: NEUROLOGY | Facility: CLINIC | Age: 48
End: 2023-04-01
Payer: COMMERCIAL

## 2023-04-03 ENCOUNTER — TELEPHONE (OUTPATIENT)
Dept: NEUROLOGY | Facility: CLINIC | Age: 48
End: 2023-04-03
Payer: COMMERCIAL

## 2023-04-03 RX ORDER — ZOLMITRIPTAN 5 MG/1
1 SPRAY NASAL AS NEEDED
Qty: 12 EACH | Refills: 5 | Status: SHIPPED | OUTPATIENT
Start: 2023-04-03

## 2023-04-03 NOTE — TELEPHONE ENCOUNTER
Caller: Shaun Harrisonistadam Marinelli    Relationship: Self    Best call back number: [phone]    Who are you requesting to speak with (clinical staff, provider,  specific staff member):   JUAN ANDRADE    What was the call regarding:   Zomig or Sumatriptan  PT RECEIVED NOTIFICATION THE ZOMIG HAS BEEN APPROVED. PT SENT MY CHART MSG ON THIS.     THE PT DOESN'T HAVE ANY KIND OF MIGRAINE RX AT THIS TIME AND PT IS HAVING MIGRAINES AND IS HAVING ISSUES BEING ABLE TO WORK.    PLEASE SEND THE RX SCRIPT TO [facility] [address]    PLEASE CALL PT TO LET HER KNOW WHEN THE SCRIPT HAS BEEN SENT.

## 2023-04-03 NOTE — TELEPHONE ENCOUNTER
Via [employer] Player-pt states she never received Zomig-it was sent to [facility] specialty and she states noone ever contact her.  Please advise-are you ok with sending in Sumitriptan or should the Zomig be sent local?

## 2023-04-17 RX ORDER — GALCANEZUMAB 120 MG/ML
120 INJECTION, SOLUTION SUBCUTANEOUS
Qty: 3 EACH | Refills: 0 | Status: SHIPPED | OUTPATIENT
Start: 2023-04-17

## 2023-08-15 ENCOUNTER — TELEMEDICINE (OUTPATIENT)
Dept: NEUROLOGY | Facility: CLINIC | Age: 48
End: 2023-08-15
Payer: COMMERCIAL

## 2023-08-15 DIAGNOSIS — G43.019 INTRACTABLE MIGRAINE WITHOUT AURA AND WITHOUT STATUS MIGRAINOSUS: Primary | ICD-10-CM

## 2023-08-15 PROCEDURE — 99213 OFFICE O/P EST LOW 20 MIN: CPT | Performed by: NURSE PRACTITIONER

## 2023-08-15 RX ORDER — SUMATRIPTAN 5 MG/1
1 SPRAY NASAL
Qty: 12 EACH | Refills: 11 | Status: SHIPPED | OUTPATIENT
Start: 2023-08-15 | End: 2023-09-14

## 2023-08-15 RX ORDER — GALCANEZUMAB 120 MG/ML
120 INJECTION, SOLUTION SUBCUTANEOUS
Qty: 3 EACH | Refills: 3 | Status: SHIPPED | OUTPATIENT
Start: 2023-08-15

## 2023-08-15 NOTE — PROGRESS NOTES
Chief Complaint  Migraine    Subjective          Marybeth Harrison presents to [facility]  History of Present Illness  Headaches have decreased to about 2 headache days per month with the Emgality.  Doing well.  Denies side effect. Using sumatriptan NS for abortive therapy.     Objective   Vital Signs:   There were no vitals taken for this visit.    Physical Exam  HENT:      Head: Normocephalic.   Pulmonary:      Effort: Pulmonary effort is normal.   Neurological:      Mental Status: She is alert and oriented to person, place, and time.      Sensory: Sensation is intact.      Motor: Motor function is intact.      Coordination: Coordination is intact.      Deep Tendon Reflexes: Reflexes are normal and symmetric.      Neurologic Exam     Mental Status   Oriented to person, place, and time.      Result Review :               Assessment and Plan    Diagnoses and all orders for this visit:    1. Intractable migraine without aura and without status migrainosus (Primary)  Assessment & Plan:  Will continue Emgality for preventative therapy and Imitrex NS PRN for abortive therapy.         Other orders  -     galcanezumab-gnlm (Emgality) 120 MG/ML auto-injector pen; Inject 1 mL under the skin into the appropriate area as directed Every 30 (Thirty) Days.  Dispense: 3 each; Refill: 3  -     SUMAtriptan (IMITREX) 5 MG/ACT nasal spray; 1 spray into the nostril(s) as directed by provider Every 2 (Two) Hours As Needed for Migraine for up to 30 days. Max dose 15mg/day  Dispense: 12 each; Refill: 11        Follow Up   No follow-ups on file.  Patient was given instructions and counseling regarding her condition or for health maintenance advice. Please see specific information pulled into the AVS if appropriate.

## 2023-12-07 ENCOUNTER — TELEMEDICINE (OUTPATIENT)
Dept: FAMILY MEDICINE CLINIC | Facility: TELEHEALTH | Age: 48
End: 2023-12-07
Payer: COMMERCIAL

## 2023-12-07 VITALS — TEMPERATURE: 98.2 F | HEART RATE: 103 BPM

## 2023-12-07 DIAGNOSIS — M79.10 MYALGIA: ICD-10-CM

## 2023-12-07 DIAGNOSIS — R11.2 NAUSEA AND VOMITING, UNSPECIFIED VOMITING TYPE: Primary | ICD-10-CM

## 2023-12-07 DIAGNOSIS — R51.9 ACUTE NONINTRACTABLE HEADACHE, UNSPECIFIED HEADACHE TYPE: ICD-10-CM

## 2023-12-07 DIAGNOSIS — K52.9 GASTROENTERITIS: ICD-10-CM

## 2023-12-07 PROBLEM — F41.0 PANIC DISORDER: Status: ACTIVE | Noted: 2023-12-07

## 2023-12-07 NOTE — PROGRESS NOTES
You have chosen to receive care through a telehealth visit.  Do you consent to use a video/audio connection for your medical care today? Yes     HPI  Marybeth Harrison is a 48 y.o. female  presents with complaint of nausea, vomiting, diarrhea, body aches. Additional symptoms that she is having are noted in the ROS portion of this visit. She is currently able to keep a little water down. She is still  having diarrhea. Her symptoms started yesterday. She is a . She would like a COVID test just to make sure that she does not have COVID    Review of Systems   Constitutional:  Positive for fatigue. Negative for chills and fever.   HENT:  Positive for congestion, sinus pressure and sinus pain. Negative for postnasal drip, rhinorrhea, sneezing and sore throat.    Respiratory:  Negative for cough, chest tightness, shortness of breath and wheezing.    Gastrointestinal:  Positive for diarrhea and nausea. Negative for abdominal pain. Vomiting: last vomited 1030 this am.  Musculoskeletal:  Positive for myalgias.   Neurological:  Positive for headaches.       Past Medical History:   Diagnosis Date    Cluster headache 1992    Headache, tension-type 1992    Insomnia     Migraines     Panic disorder        Family History   Problem Relation Age of Onset    Colon cancer Neg Hx        Social History     Socioeconomic History    Marital status:    Tobacco Use    Smoking status: Never    Smokeless tobacco: Never   Vaping Use    Vaping Use: Never used   Substance and Sexual Activity    Alcohol use: Yes     Alcohol/week: 0.0 standard drinks of alcohol     Comment: SOCIALLY    Drug use: Never    Sexual activity: Yes     Partners: Male     Birth control/protection: Condom       Marybeth Harrison  reports that she has never smoked. She has never used smokeless tobacco..     Pulse 103   Temp 98.2 °F (36.8 °C)   Breastfeeding No     PHYSICAL EXAM  Physical Exam   Constitutional: She is oriented to person, place, and  time. She appears well-developed.   HENT:   Head: Normocephalic and atraumatic.   Nose: Right sinus exhibits maxillary sinus tenderness (patient directed exam) and frontal sinus tenderness (patient directed exam). Left sinus exhibits maxillary sinus tenderness (patient directed exam) and frontal sinus tenderness (patient directed exam).   Eyes: Lids are normal. Right eye exhibits no discharge. Left eye exhibits no discharge. Right conjunctiva is not injected. Left conjunctiva is not injected.   Pulmonary/Chest:  No respiratory distress.  Abdominal: There is no abdominal tenderness.   Neurological: She is alert and oriented to person, place, and time. No cranial nerve deficit.   Psychiatric: She has a normal mood and affect. Her speech is normal and behavior is normal. Judgment and thought content normal.       Results for orders placed or performed during the hospital encounter of 01/10/22   COVID-19,APTIMA PANTHER(GHAZAL), ROSALINA/ VIBHA, NP/OP SWAB IN UTM/VTM/SALINE TRANSPORT MEDIA,24 HR TAT - Swab, Nasopharynx    Specimen: Nasopharynx; Swab   Result Value Ref Range    COVID19 Not Detected Not Detected - Ref. Range   Comprehensive Metabolic Panel    Specimen: Arm, Right; Blood   Result Value Ref Range    Glucose 117 (H) 65 - 99 mg/dL    BUN 10 6 - 20 mg/dL    Creatinine 0.79 0.57 - 1.00 mg/dL    Sodium 135 (L) 136 - 145 mmol/L    Potassium 3.8 3.5 - 5.2 mmol/L    Chloride 99 98 - 107 mmol/L    CO2 25.8 22.0 - 29.0 mmol/L    Calcium 9.5 8.6 - 10.5 mg/dL    Total Protein 8.1 6.0 - 8.5 g/dL    Albumin 4.90 3.50 - 5.20 g/dL    ALT (SGPT) 10 1 - 33 U/L    AST (SGOT) 16 1 - 32 U/L    Alkaline Phosphatase 105 39 - 117 U/L    Total Bilirubin 0.3 0.0 - 1.2 mg/dL    eGFR Non African Amer 79 >60 mL/min/1.73    Globulin 3.2 gm/dL    A/G Ratio 1.5 g/dL    BUN/Creatinine Ratio 12.7 7.0 - 25.0    Anion Gap 10.2 5.0 - 15.0 mmol/L   hCG, Quantitative, Pregnancy    Specimen: Arm, Right; Blood   Result Value Ref Range    HCG Quantitative  <0.50 mIU/mL   CBC Auto Differential    Specimen: Arm, Right; Blood   Result Value Ref Range    WBC 10.17 3.40 - 10.80 10*3/mm3    RBC 4.79 3.77 - 5.28 10*6/mm3    Hemoglobin 13.4 12.0 - 15.9 g/dL    Hematocrit 41.6 34.0 - 46.6 %    MCV 86.8 79.0 - 97.0 fL    MCH 28.0 26.6 - 33.0 pg    MCHC 32.2 31.5 - 35.7 g/dL    RDW 13.9 12.3 - 15.4 %    RDW-SD 43.8 37.0 - 54.0 fl    MPV 10.1 6.0 - 12.0 fL    Platelets 276 140 - 450 10*3/mm3    Neutrophil % 83.7 (H) 42.7 - 76.0 %    Lymphocyte % 12.4 (L) 19.6 - 45.3 %    Monocyte % 3.1 (L) 5.0 - 12.0 %    Eosinophil % 0.0 (L) 0.3 - 6.2 %    Basophil % 0.5 0.0 - 1.5 %    Immature Grans % 0.3 0.0 - 0.5 %    Neutrophils, Absolute 8.51 (H) 1.70 - 7.00 10*3/mm3    Lymphocytes, Absolute 1.26 0.70 - 3.10 10*3/mm3    Monocytes, Absolute 0.32 0.10 - 0.90 10*3/mm3    Eosinophils, Absolute 0.00 0.00 - 0.40 10*3/mm3    Basophils, Absolute 0.05 0.00 - 0.20 10*3/mm3    Immature Grans, Absolute 0.03 0.00 - 0.05 10*3/mm3    nRBC 0.0 0.0 - 0.2 /100 WBC   D-dimer, Quantitative    Specimen: Blood   Result Value Ref Range    D-Dimer, Quantitative <0.17 0.00 - 0.59 mg/L (FEU)   TSH    Specimen: Arm, Right; Blood   Result Value Ref Range    TSH 1.220 0.270 - 4.200 uIU/mL   ECG 12 Lead   Result Value Ref Range    QT Interval 340 ms   Green Top (Gel)   Result Value Ref Range    Extra Tube Hold for add-ons.    Lavender Top   Result Value Ref Range    Extra Tube hold for add-on    Gold Top - SST   Result Value Ref Range    Extra Tube Hold for add-ons.    Light Blue Top   Result Value Ref Range    Extra Tube hold for add-on        Diagnoses and all orders for this visit:    1. Nausea and vomiting, unspecified vomiting type (Primary)  -     DAINA FLU + SARS PCR; Future    2. Acute nonintractable headache, unspecified headache type  -     DAINA FLU + SARS PCR; Future    3. Myalgia  -     DAINA FLU + SARS PCR; Future    Hydrate well. Water, Pedialyte and Gatorade are best.  Holiday diet  COVID, flu test  pending    FOLLOW-UP  If symptoms worsen or persist follow up with PCP, Virtual Care or Urgent Care    Patient verbalizes understanding of medication dosage, comfort measures, instructions for treatment and follow-up.    Rosy Ruiz, APRN  12/07/2023  13:21 EST    The use of a video visit has been reviewed with the patient and verbal informed consent has been obtained. Myself and Marybeth Harrison participated in this visit. The patient is located in [address] [place] [address].    I am located in [place], [place]. Mychart and Tyto were utilized. I spent 25 minutes in the patient's chart for this visit.    Negative COVID, flu results called to patient

## 2023-12-07 NOTE — LETTER
December 7, 2023       No Recipients    Patient: Marybeth Marinelli Hunter   YOB: 1975   Date of Visit: 12/7/2023     Dear Marybeth Harrison:       Marybeth Harrison was evaluated by me and may return to work on 12/09/2023.         Sincerely,        PADMINI Plunkett        CC:   No Recipients

## 2025-01-04 ENCOUNTER — HOSPITAL ENCOUNTER (INPATIENT)
Facility: HOSPITAL | Age: 50
LOS: 1 days | Discharge: HOME | DRG: 419 | End: 2025-01-06
Attending: EMERGENCY MEDICINE | Admitting: STUDENT IN AN ORGANIZED HEALTH CARE EDUCATION/TRAINING PROGRAM
Payer: COMMERCIAL

## 2025-01-04 ENCOUNTER — APPOINTMENT (OUTPATIENT)
Dept: CT IMAGING | Facility: HOSPITAL | Age: 50
DRG: 419 | End: 2025-01-04
Payer: COMMERCIAL

## 2025-01-04 DIAGNOSIS — K81.0 ACUTE CHOLECYSTITIS: Primary | ICD-10-CM

## 2025-01-04 DIAGNOSIS — R79.89 ELEVATED LFTS: ICD-10-CM

## 2025-01-04 DIAGNOSIS — K80.00 CALCULUS OF GALLBLADDER WITH ACUTE CHOLECYSTITIS WITHOUT OBSTRUCTION: ICD-10-CM

## 2025-01-04 LAB
ALBUMIN SERPL-MCNC: 4.1 G/DL (ref 3.5–5.2)
ALBUMIN/GLOB SERPL: 1.4 G/DL
ALP SERPL-CCNC: 199 U/L (ref 39–117)
ALT SERPL W P-5'-P-CCNC: 295 U/L (ref 1–33)
ANION GAP SERPL CALCULATED.3IONS-SCNC: 8.4 MMOL/L (ref 5–15)
AST SERPL-CCNC: 218 U/L (ref 1–32)
BASOPHILS # BLD AUTO: 0.05 10*3/MM3 (ref 0–0.2)
BASOPHILS NFR BLD AUTO: 0.5 % (ref 0–1.5)
BILIRUB SERPL-MCNC: 1.2 MG/DL (ref 0–1.2)
BILIRUB UR QL STRIP: NEGATIVE
BUN SERPL-MCNC: 7 MG/DL (ref 6–20)
BUN/CREAT SERPL: 6.5 (ref 7–25)
CALCIUM SPEC-SCNC: 9.1 MG/DL (ref 8.6–10.5)
CHLORIDE SERPL-SCNC: 104 MMOL/L (ref 98–107)
CLARITY UR: CLEAR
CO2 SERPL-SCNC: 26.6 MMOL/L (ref 22–29)
COLOR UR: ABNORMAL
CREAT SERPL-MCNC: 1.07 MG/DL (ref 0.57–1)
DEPRECATED RDW RBC AUTO: 40.8 FL (ref 37–54)
EGFRCR SERPLBLD CKD-EPI 2021: 64.6 ML/MIN/1.73
EOSINOPHIL # BLD AUTO: 0.13 10*3/MM3 (ref 0–0.4)
EOSINOPHIL NFR BLD AUTO: 1.2 % (ref 0.3–6.2)
ERYTHROCYTE [DISTWIDTH] IN BLOOD BY AUTOMATED COUNT: 12.3 % (ref 12.3–15.4)
GLOBULIN UR ELPH-MCNC: 2.9 GM/DL
GLUCOSE SERPL-MCNC: 134 MG/DL (ref 65–99)
GLUCOSE UR STRIP-MCNC: NEGATIVE MG/DL
HCG INTACT+B SERPL-ACNC: <0.5 MIU/ML
HCT VFR BLD AUTO: 37.2 % (ref 34–46.6)
HGB BLD-MCNC: 12 G/DL (ref 12–15.9)
HGB UR QL STRIP.AUTO: NEGATIVE
HOLD SPECIMEN: NORMAL
HOLD SPECIMEN: NORMAL
IMM GRANULOCYTES # BLD AUTO: 0.02 10*3/MM3 (ref 0–0.05)
IMM GRANULOCYTES NFR BLD AUTO: 0.2 % (ref 0–0.5)
INR PPP: 0.95 (ref 0.86–1.15)
KETONES UR QL STRIP: NEGATIVE
LEUKOCYTE ESTERASE UR QL STRIP.AUTO: NEGATIVE
LIPASE SERPL-CCNC: 26 U/L (ref 13–60)
LYMPHOCYTES # BLD AUTO: 1.73 10*3/MM3 (ref 0.7–3.1)
LYMPHOCYTES NFR BLD AUTO: 15.8 % (ref 19.6–45.3)
MCH RBC QN AUTO: 29.4 PG (ref 26.6–33)
MCHC RBC AUTO-ENTMCNC: 32.3 G/DL (ref 31.5–35.7)
MCV RBC AUTO: 91.2 FL (ref 79–97)
MONOCYTES # BLD AUTO: 0.56 10*3/MM3 (ref 0.1–0.9)
MONOCYTES NFR BLD AUTO: 5.1 % (ref 5–12)
NEUTROPHILS NFR BLD AUTO: 77.2 % (ref 42.7–76)
NEUTROPHILS NFR BLD AUTO: 8.47 10*3/MM3 (ref 1.7–7)
NITRITE UR QL STRIP: NEGATIVE
NRBC BLD AUTO-RTO: 0 /100 WBC (ref 0–0.2)
PH UR STRIP.AUTO: 6.5 [PH] (ref 5–8)
PLATELET # BLD AUTO: 269 10*3/MM3 (ref 140–450)
PMV BLD AUTO: 10.4 FL (ref 6–12)
POTASSIUM SERPL-SCNC: 3.9 MMOL/L (ref 3.5–5.2)
PROT SERPL-MCNC: 7 G/DL (ref 6–8.5)
PROT UR QL STRIP: NEGATIVE
PROTHROMBIN TIME: 12.9 SECONDS (ref 11.8–14.9)
RBC # BLD AUTO: 4.08 10*6/MM3 (ref 3.77–5.28)
SODIUM SERPL-SCNC: 139 MMOL/L (ref 136–145)
SP GR UR STRIP: 1.02 (ref 1–1.03)
UROBILINOGEN UR QL STRIP: ABNORMAL
WBC NRBC COR # BLD AUTO: 10.96 10*3/MM3 (ref 3.4–10.8)
WHOLE BLOOD HOLD COAG: NORMAL
WHOLE BLOOD HOLD SPECIMEN: NORMAL

## 2025-01-04 PROCEDURE — 83690 ASSAY OF LIPASE: CPT

## 2025-01-04 PROCEDURE — 25510000001 IOPAMIDOL PER 1 ML: Performed by: EMERGENCY MEDICINE

## 2025-01-04 PROCEDURE — 99285 EMERGENCY DEPT VISIT HI MDM: CPT

## 2025-01-04 PROCEDURE — 85025 COMPLETE CBC W/AUTO DIFF WBC: CPT

## 2025-01-04 PROCEDURE — 81003 URINALYSIS AUTO W/O SCOPE: CPT

## 2025-01-04 PROCEDURE — 25010000002 ONDANSETRON PER 1 MG

## 2025-01-04 PROCEDURE — 25810000003 SODIUM CHLORIDE 0.9 % SOLUTION

## 2025-01-04 PROCEDURE — 85610 PROTHROMBIN TIME: CPT

## 2025-01-04 PROCEDURE — 84702 CHORIONIC GONADOTROPIN TEST: CPT

## 2025-01-04 PROCEDURE — 80053 COMPREHEN METABOLIC PANEL: CPT

## 2025-01-04 PROCEDURE — 25010000002 KETOROLAC TROMETHAMINE PER 15 MG: Performed by: NURSE PRACTITIONER

## 2025-01-04 PROCEDURE — 36415 COLL VENOUS BLD VENIPUNCTURE: CPT

## 2025-01-04 PROCEDURE — 74177 CT ABD & PELVIS W/CONTRAST: CPT

## 2025-01-04 RX ORDER — IOPAMIDOL 755 MG/ML
100 INJECTION, SOLUTION INTRAVASCULAR
Status: COMPLETED | OUTPATIENT
Start: 2025-01-05 | End: 2025-01-04

## 2025-01-04 RX ORDER — KETOROLAC TROMETHAMINE 30 MG/ML
30 INJECTION, SOLUTION INTRAMUSCULAR; INTRAVENOUS ONCE
Status: COMPLETED | OUTPATIENT
Start: 2025-01-04 | End: 2025-01-04

## 2025-01-04 RX ORDER — SODIUM CHLORIDE 0.9 % (FLUSH) 0.9 %
10 SYRINGE (ML) INJECTION AS NEEDED
Status: DISCONTINUED | OUTPATIENT
Start: 2025-01-04 | End: 2025-01-06 | Stop reason: HOSPADM

## 2025-01-04 RX ORDER — DICYCLOMINE HYDROCHLORIDE 10 MG/1
40 CAPSULE ORAL ONCE
Status: COMPLETED | OUTPATIENT
Start: 2025-01-04 | End: 2025-01-04

## 2025-01-04 RX ORDER — ONDANSETRON 2 MG/ML
4 INJECTION INTRAMUSCULAR; INTRAVENOUS ONCE
Status: COMPLETED | OUTPATIENT
Start: 2025-01-04 | End: 2025-01-04

## 2025-01-04 RX ADMIN — SODIUM CHLORIDE 1000 ML: 9 INJECTION, SOLUTION INTRAVENOUS at 23:46

## 2025-01-04 RX ADMIN — KETOROLAC TROMETHAMINE 30 MG: 30 INJECTION, SOLUTION INTRAMUSCULAR; INTRAVENOUS at 23:49

## 2025-01-04 RX ADMIN — DICYCLOMINE HYDROCHLORIDE 40 MG: 10 CAPSULE ORAL at 23:50

## 2025-01-04 RX ADMIN — ONDANSETRON 4 MG: 2 INJECTION INTRAMUSCULAR; INTRAVENOUS at 23:48

## 2025-01-04 RX ADMIN — IOPAMIDOL 100 ML: 755 INJECTION, SOLUTION INTRAVENOUS at 23:41

## 2025-01-05 ENCOUNTER — ANESTHESIA EVENT (OUTPATIENT)
Dept: PERIOP | Facility: HOSPITAL | Age: 50
End: 2025-01-05
Payer: COMMERCIAL

## 2025-01-05 ENCOUNTER — ANESTHESIA (OUTPATIENT)
Dept: PERIOP | Facility: HOSPITAL | Age: 50
End: 2025-01-05
Payer: COMMERCIAL

## 2025-01-05 ENCOUNTER — APPOINTMENT (OUTPATIENT)
Dept: MRI IMAGING | Facility: HOSPITAL | Age: 50
DRG: 419 | End: 2025-01-05
Payer: COMMERCIAL

## 2025-01-05 PROBLEM — R10.9 ABDOMINAL PAIN: Status: ACTIVE | Noted: 2025-01-05

## 2025-01-05 PROBLEM — K81.0 ACUTE CHOLECYSTITIS: Status: ACTIVE | Noted: 2025-01-05

## 2025-01-05 LAB
ALBUMIN SERPL-MCNC: 3.7 G/DL (ref 3.5–5.2)
ALBUMIN/GLOB SERPL: 1.5 G/DL
ALP SERPL-CCNC: 213 U/L (ref 39–117)
ALT SERPL W P-5'-P-CCNC: 293 U/L (ref 1–33)
ANION GAP SERPL CALCULATED.3IONS-SCNC: 9.4 MMOL/L (ref 5–15)
AST SERPL-CCNC: 317 U/L (ref 1–32)
BASOPHILS # BLD AUTO: 0.05 10*3/MM3 (ref 0–0.2)
BASOPHILS NFR BLD AUTO: 0.6 % (ref 0–1.5)
BILIRUB SERPL-MCNC: 1.8 MG/DL (ref 0–1.2)
BUN SERPL-MCNC: 5 MG/DL (ref 6–20)
BUN/CREAT SERPL: 6.1 (ref 7–25)
CALCIUM SPEC-SCNC: 9 MG/DL (ref 8.6–10.5)
CHLORIDE SERPL-SCNC: 101 MMOL/L (ref 98–107)
CO2 SERPL-SCNC: 23.6 MMOL/L (ref 22–29)
CREAT SERPL-MCNC: 0.82 MG/DL (ref 0.57–1)
DEPRECATED RDW RBC AUTO: 40.9 FL (ref 37–54)
EGFRCR SERPLBLD CKD-EPI 2021: 88.9 ML/MIN/1.73
EOSINOPHIL # BLD AUTO: 0.06 10*3/MM3 (ref 0–0.4)
EOSINOPHIL NFR BLD AUTO: 0.8 % (ref 0.3–6.2)
ERYTHROCYTE [DISTWIDTH] IN BLOOD BY AUTOMATED COUNT: 12 % (ref 12.3–15.4)
GLOBULIN UR ELPH-MCNC: 2.5 GM/DL
GLUCOSE SERPL-MCNC: 105 MG/DL (ref 65–99)
HCT VFR BLD AUTO: 36.3 % (ref 34–46.6)
HGB BLD-MCNC: 11.7 G/DL (ref 12–15.9)
IMM GRANULOCYTES # BLD AUTO: 0.07 10*3/MM3 (ref 0–0.05)
IMM GRANULOCYTES NFR BLD AUTO: 0.9 % (ref 0–0.5)
LYMPHOCYTES # BLD AUTO: 1.77 10*3/MM3 (ref 0.7–3.1)
LYMPHOCYTES NFR BLD AUTO: 22.4 % (ref 19.6–45.3)
MCH RBC QN AUTO: 29.7 PG (ref 26.6–33)
MCHC RBC AUTO-ENTMCNC: 32.2 G/DL (ref 31.5–35.7)
MCV RBC AUTO: 92.1 FL (ref 79–97)
MONOCYTES # BLD AUTO: 0.57 10*3/MM3 (ref 0.1–0.9)
MONOCYTES NFR BLD AUTO: 7.2 % (ref 5–12)
NEUTROPHILS NFR BLD AUTO: 5.39 10*3/MM3 (ref 1.7–7)
NEUTROPHILS NFR BLD AUTO: 68.1 % (ref 42.7–76)
NRBC BLD AUTO-RTO: 0 /100 WBC (ref 0–0.2)
PLATELET # BLD AUTO: 242 10*3/MM3 (ref 140–450)
PMV BLD AUTO: 10.5 FL (ref 6–12)
POTASSIUM SERPL-SCNC: 4 MMOL/L (ref 3.5–5.2)
PROT SERPL-MCNC: 6.2 G/DL (ref 6–8.5)
RBC # BLD AUTO: 3.94 10*6/MM3 (ref 3.77–5.28)
SODIUM SERPL-SCNC: 134 MMOL/L (ref 136–145)
WBC NRBC COR # BLD AUTO: 7.91 10*3/MM3 (ref 3.4–10.8)

## 2025-01-05 PROCEDURE — 25010000002 PIPERACILLIN SOD-TAZOBACTAM PER 1 G: Performed by: FAMILY MEDICINE

## 2025-01-05 PROCEDURE — 25010000002 DEXAMETHASONE PER 1 MG: Performed by: NURSE ANESTHETIST, CERTIFIED REGISTERED

## 2025-01-05 PROCEDURE — 25010000002 MIDAZOLAM PER 1MG: Performed by: ANESTHESIOLOGY

## 2025-01-05 PROCEDURE — 99223 1ST HOSP IP/OBS HIGH 75: CPT | Performed by: STUDENT IN AN ORGANIZED HEALTH CARE EDUCATION/TRAINING PROGRAM

## 2025-01-05 PROCEDURE — 25010000002 BUPIVACAINE (PF) 0.25 % SOLUTION: Performed by: SURGERY

## 2025-01-05 PROCEDURE — 47563 LAPARO CHOLECYSTECTOMY/GRAPH: CPT

## 2025-01-05 PROCEDURE — 25010000002 LIDOCAINE PF 2% 2 % SOLUTION: Performed by: NURSE ANESTHETIST, CERTIFIED REGISTERED

## 2025-01-05 PROCEDURE — 8E0W4CZ ROBOTIC ASSISTED PROCEDURE OF TRUNK REGION, PERCUTANEOUS ENDOSCOPIC APPROACH: ICD-10-PCS | Performed by: SURGERY

## 2025-01-05 PROCEDURE — 25010000002 ONDANSETRON PER 1 MG: Performed by: SURGERY

## 2025-01-05 PROCEDURE — 25010000002 KETOROLAC TROMETHAMINE PER 15 MG: Performed by: NURSE ANESTHETIST, CERTIFIED REGISTERED

## 2025-01-05 PROCEDURE — 0FT44ZZ RESECTION OF GALLBLADDER, PERCUTANEOUS ENDOSCOPIC APPROACH: ICD-10-PCS | Performed by: SURGERY

## 2025-01-05 PROCEDURE — 25010000002 FENTANYL CITRATE (PF) 50 MCG/ML SOLUTION: Performed by: NURSE ANESTHETIST, CERTIFIED REGISTERED

## 2025-01-05 PROCEDURE — 74181 MRI ABDOMEN W/O CONTRAST: CPT

## 2025-01-05 PROCEDURE — 80053 COMPREHEN METABOLIC PANEL: CPT | Performed by: STUDENT IN AN ORGANIZED HEALTH CARE EDUCATION/TRAINING PROGRAM

## 2025-01-05 PROCEDURE — 99222 1ST HOSP IP/OBS MODERATE 55: CPT | Performed by: SURGERY

## 2025-01-05 PROCEDURE — 25010000002 MAGNESIUM SULFATE PER 500 MG OF MAGNESIUM: Performed by: NURSE ANESTHETIST, CERTIFIED REGISTERED

## 2025-01-05 PROCEDURE — 47563 LAPARO CHOLECYSTECTOMY/GRAPH: CPT | Performed by: SURGERY

## 2025-01-05 PROCEDURE — 25010000002 PROPOFOL 10 MG/ML EMULSION: Performed by: NURSE ANESTHETIST, CERTIFIED REGISTERED

## 2025-01-05 PROCEDURE — 25010000002 ONDANSETRON PER 1 MG: Performed by: NURSE ANESTHETIST, CERTIFIED REGISTERED

## 2025-01-05 PROCEDURE — 80074 ACUTE HEPATITIS PANEL: CPT

## 2025-01-05 PROCEDURE — 25010000002 INDOCYANINE GREEN 25 MG RECONSTITUTED SOLUTION: Performed by: SURGERY

## 2025-01-05 PROCEDURE — 25810000003 SODIUM CHLORIDE 0.9 % SOLUTION: Performed by: STUDENT IN AN ORGANIZED HEALTH CARE EDUCATION/TRAINING PROGRAM

## 2025-01-05 PROCEDURE — 25010000002 MORPHINE PER 10 MG: Performed by: SURGERY

## 2025-01-05 PROCEDURE — 25010000002 HYDROMORPHONE 1 MG/ML SOLUTION: Performed by: NURSE ANESTHETIST, CERTIFIED REGISTERED

## 2025-01-05 PROCEDURE — 88304 TISSUE EXAM BY PATHOLOGIST: CPT | Performed by: SURGERY

## 2025-01-05 PROCEDURE — 25010000002 PIPERACILLIN SOD-TAZOBACTAM PER 1 G: Performed by: SURGERY

## 2025-01-05 PROCEDURE — 25010000002 SUGAMMADEX 200 MG/2ML SOLUTION: Performed by: NURSE ANESTHETIST, CERTIFIED REGISTERED

## 2025-01-05 PROCEDURE — 85025 COMPLETE CBC W/AUTO DIFF WBC: CPT | Performed by: STUDENT IN AN ORGANIZED HEALTH CARE EDUCATION/TRAINING PROGRAM

## 2025-01-05 PROCEDURE — 25010000002 HYDROMORPHONE 1 MG/ML SOLUTION: Performed by: SURGERY

## 2025-01-05 DEVICE — MEDIUM-LARGE LIGATION CLIPS 6 CLIPS/CART
Type: IMPLANTABLE DEVICE | Site: ABDOMEN | Status: FUNCTIONAL
Brand: VAS-Q-CLIP

## 2025-01-05 RX ORDER — MAGNESIUM SULFATE HEPTAHYDRATE 500 MG/ML
INJECTION, SOLUTION INTRAMUSCULAR; INTRAVENOUS AS NEEDED
Status: DISCONTINUED | OUTPATIENT
Start: 2025-01-05 | End: 2025-01-05 | Stop reason: SURG

## 2025-01-05 RX ORDER — AMOXICILLIN 250 MG
2 CAPSULE ORAL 2 TIMES DAILY PRN
Status: DISCONTINUED | OUTPATIENT
Start: 2025-01-05 | End: 2025-01-06 | Stop reason: HOSPADM

## 2025-01-05 RX ORDER — BISACODYL 10 MG
10 SUPPOSITORY, RECTAL RECTAL DAILY PRN
Status: DISCONTINUED | OUTPATIENT
Start: 2025-01-05 | End: 2025-01-06 | Stop reason: HOSPADM

## 2025-01-05 RX ORDER — ONDANSETRON 2 MG/ML
4 INJECTION INTRAMUSCULAR; INTRAVENOUS EVERY 6 HOURS PRN
Status: DISCONTINUED | OUTPATIENT
Start: 2025-01-05 | End: 2025-01-06 | Stop reason: HOSPADM

## 2025-01-05 RX ORDER — SODIUM CHLORIDE 0.9 % (FLUSH) 0.9 %
10 SYRINGE (ML) INJECTION AS NEEDED
Status: DISCONTINUED | OUTPATIENT
Start: 2025-01-05 | End: 2025-01-06 | Stop reason: HOSPADM

## 2025-01-05 RX ORDER — BUPIVACAINE HYDROCHLORIDE 2.5 MG/ML
INJECTION, SOLUTION EPIDURAL; INFILTRATION; INTRACAUDAL AS NEEDED
Status: DISCONTINUED | OUTPATIENT
Start: 2025-01-05 | End: 2025-01-05 | Stop reason: HOSPADM

## 2025-01-05 RX ORDER — LIDOCAINE HYDROCHLORIDE 20 MG/ML
INJECTION, SOLUTION EPIDURAL; INFILTRATION; INTRACAUDAL; PERINEURAL AS NEEDED
Status: DISCONTINUED | OUTPATIENT
Start: 2025-01-05 | End: 2025-01-05 | Stop reason: SURG

## 2025-01-05 RX ORDER — ACETAMINOPHEN 325 MG/1
650 TABLET ORAL EVERY 4 HOURS PRN
Status: DISCONTINUED | OUTPATIENT
Start: 2025-01-05 | End: 2025-01-06 | Stop reason: HOSPADM

## 2025-01-05 RX ORDER — ONDANSETRON 2 MG/ML
4 INJECTION INTRAMUSCULAR; INTRAVENOUS EVERY 6 HOURS PRN
Status: DISCONTINUED | OUTPATIENT
Start: 2025-01-05 | End: 2025-01-05 | Stop reason: SDUPTHER

## 2025-01-05 RX ORDER — NALOXONE HCL 0.4 MG/ML
0.4 VIAL (ML) INJECTION
Status: DISCONTINUED | OUTPATIENT
Start: 2025-01-05 | End: 2025-01-06 | Stop reason: HOSPADM

## 2025-01-05 RX ORDER — SODIUM CHLORIDE 0.9 % (FLUSH) 0.9 %
10 SYRINGE (ML) INJECTION EVERY 12 HOURS SCHEDULED
Status: DISCONTINUED | OUTPATIENT
Start: 2025-01-05 | End: 2025-01-06 | Stop reason: HOSPADM

## 2025-01-05 RX ORDER — ACETAMINOPHEN 650 MG/1
650 SUPPOSITORY RECTAL EVERY 4 HOURS PRN
Status: DISCONTINUED | OUTPATIENT
Start: 2025-01-05 | End: 2025-01-06 | Stop reason: HOSPADM

## 2025-01-05 RX ORDER — ONDANSETRON 2 MG/ML
4 INJECTION INTRAMUSCULAR; INTRAVENOUS EVERY 6 HOURS PRN
Status: DISCONTINUED | OUTPATIENT
Start: 2025-01-05 | End: 2025-01-05

## 2025-01-05 RX ORDER — ONDANSETRON 2 MG/ML
INJECTION INTRAMUSCULAR; INTRAVENOUS AS NEEDED
Status: DISCONTINUED | OUTPATIENT
Start: 2025-01-05 | End: 2025-01-05 | Stop reason: SURG

## 2025-01-05 RX ORDER — ONDANSETRON 4 MG/1
4 TABLET, ORALLY DISINTEGRATING ORAL EVERY 6 HOURS PRN
Status: DISCONTINUED | OUTPATIENT
Start: 2025-01-05 | End: 2025-01-05

## 2025-01-05 RX ORDER — DEXMEDETOMIDINE HYDROCHLORIDE 100 UG/ML
INJECTION, SOLUTION INTRAVENOUS AS NEEDED
Status: DISCONTINUED | OUTPATIENT
Start: 2025-01-05 | End: 2025-01-05 | Stop reason: SURG

## 2025-01-05 RX ORDER — SODIUM CHLORIDE 9 MG/ML
40 INJECTION, SOLUTION INTRAVENOUS AS NEEDED
Status: DISCONTINUED | OUTPATIENT
Start: 2025-01-05 | End: 2025-01-06 | Stop reason: HOSPADM

## 2025-01-05 RX ORDER — KETOROLAC TROMETHAMINE 30 MG/ML
INJECTION, SOLUTION INTRAMUSCULAR; INTRAVENOUS AS NEEDED
Status: DISCONTINUED | OUTPATIENT
Start: 2025-01-05 | End: 2025-01-05 | Stop reason: SURG

## 2025-01-05 RX ORDER — DOCUSATE SODIUM 100 MG/1
100 CAPSULE, LIQUID FILLED ORAL 2 TIMES DAILY PRN
Status: DISCONTINUED | OUTPATIENT
Start: 2025-01-05 | End: 2025-01-06 | Stop reason: HOSPADM

## 2025-01-05 RX ORDER — SODIUM CHLORIDE 0.9 % (FLUSH) 0.9 %
10 SYRINGE (ML) INJECTION AS NEEDED
Status: DISCONTINUED | OUTPATIENT
Start: 2025-01-05 | End: 2025-01-05 | Stop reason: HOSPADM

## 2025-01-05 RX ORDER — SODIUM CHLORIDE 0.9 % (FLUSH) 0.9 %
10 SYRINGE (ML) INJECTION EVERY 12 HOURS SCHEDULED
Status: DISCONTINUED | OUTPATIENT
Start: 2025-01-05 | End: 2025-01-05 | Stop reason: HOSPADM

## 2025-01-05 RX ORDER — DEXAMETHASONE SODIUM PHOSPHATE 4 MG/ML
INJECTION, SOLUTION INTRA-ARTICULAR; INTRALESIONAL; INTRAMUSCULAR; INTRAVENOUS; SOFT TISSUE AS NEEDED
Status: DISCONTINUED | OUTPATIENT
Start: 2025-01-05 | End: 2025-01-05 | Stop reason: SURG

## 2025-01-05 RX ORDER — SCOLOPAMINE TRANSDERMAL SYSTEM 1 MG/1
1 PATCH, EXTENDED RELEASE TRANSDERMAL ONCE
Status: DISCONTINUED | OUTPATIENT
Start: 2025-01-05 | End: 2025-01-05

## 2025-01-05 RX ORDER — INDOCYANINE GREEN AND WATER 25 MG
2.5 KIT INJECTION ONCE
Status: COMPLETED | OUTPATIENT
Start: 2025-01-05 | End: 2025-01-05

## 2025-01-05 RX ORDER — ROCURONIUM BROMIDE 10 MG/ML
INJECTION, SOLUTION INTRAVENOUS AS NEEDED
Status: DISCONTINUED | OUTPATIENT
Start: 2025-01-05 | End: 2025-01-05 | Stop reason: SURG

## 2025-01-05 RX ORDER — SODIUM CHLORIDE 9 MG/ML
40 INJECTION, SOLUTION INTRAVENOUS AS NEEDED
Status: DISCONTINUED | OUTPATIENT
Start: 2025-01-05 | End: 2025-01-05 | Stop reason: HOSPADM

## 2025-01-05 RX ORDER — MAGNESIUM HYDROXIDE 1200 MG/15ML
LIQUID ORAL AS NEEDED
Status: DISCONTINUED | OUTPATIENT
Start: 2025-01-05 | End: 2025-01-05 | Stop reason: HOSPADM

## 2025-01-05 RX ORDER — SCOLOPAMINE TRANSDERMAL SYSTEM 1 MG/1
1 PATCH, EXTENDED RELEASE TRANSDERMAL CONTINUOUS
Status: DISCONTINUED | OUTPATIENT
Start: 2025-01-05 | End: 2025-01-06 | Stop reason: HOSPADM

## 2025-01-05 RX ORDER — ONDANSETRON 2 MG/ML
4 INJECTION INTRAMUSCULAR; INTRAVENOUS ONCE AS NEEDED
Status: DISCONTINUED | OUTPATIENT
Start: 2025-01-05 | End: 2025-01-05 | Stop reason: HOSPADM

## 2025-01-05 RX ORDER — PHENYLEPHRINE HCL IN 0.9% NACL 0.5 MG/5ML
SYRINGE (ML) INTRAVENOUS AS NEEDED
Status: DISCONTINUED | OUTPATIENT
Start: 2025-01-05 | End: 2025-01-05 | Stop reason: SURG

## 2025-01-05 RX ORDER — MEPERIDINE HYDROCHLORIDE 25 MG/ML
12.5 INJECTION INTRAMUSCULAR; INTRAVENOUS; SUBCUTANEOUS
Status: DISCONTINUED | OUTPATIENT
Start: 2025-01-05 | End: 2025-01-05 | Stop reason: HOSPADM

## 2025-01-05 RX ORDER — BISACODYL 5 MG/1
5 TABLET, DELAYED RELEASE ORAL DAILY PRN
Status: DISCONTINUED | OUTPATIENT
Start: 2025-01-05 | End: 2025-01-06 | Stop reason: HOSPADM

## 2025-01-05 RX ORDER — HEPARIN SODIUM 5000 [USP'U]/ML
5000 INJECTION, SOLUTION INTRAVENOUS; SUBCUTANEOUS EVERY 12 HOURS SCHEDULED
Status: DISCONTINUED | OUTPATIENT
Start: 2025-01-06 | End: 2025-01-06 | Stop reason: HOSPADM

## 2025-01-05 RX ORDER — CLONAZEPAM 0.5 MG/1
0.5 TABLET ORAL 2 TIMES DAILY PRN
Status: DISCONTINUED | OUTPATIENT
Start: 2025-01-05 | End: 2025-01-06 | Stop reason: HOSPADM

## 2025-01-05 RX ORDER — HYDROCODONE BITARTRATE AND ACETAMINOPHEN 5; 325 MG/1; MG/1
1 TABLET ORAL EVERY 6 HOURS PRN
Qty: 10 TABLET | Refills: 0 | Status: SHIPPED | OUTPATIENT
Start: 2025-01-05

## 2025-01-05 RX ORDER — ONDANSETRON 4 MG/1
4 TABLET, ORALLY DISINTEGRATING ORAL EVERY 6 HOURS PRN
Status: DISCONTINUED | OUTPATIENT
Start: 2025-01-05 | End: 2025-01-06 | Stop reason: HOSPADM

## 2025-01-05 RX ORDER — POLYETHYLENE GLYCOL 3350 17 G/17G
17 POWDER, FOR SOLUTION ORAL DAILY PRN
Status: DISCONTINUED | OUTPATIENT
Start: 2025-01-05 | End: 2025-01-06 | Stop reason: HOSPADM

## 2025-01-05 RX ORDER — PROMETHAZINE HYDROCHLORIDE 12.5 MG/1
25 TABLET ORAL ONCE AS NEEDED
Status: DISCONTINUED | OUTPATIENT
Start: 2025-01-05 | End: 2025-01-05 | Stop reason: HOSPADM

## 2025-01-05 RX ORDER — PROMETHAZINE HYDROCHLORIDE 25 MG/1
25 SUPPOSITORY RECTAL ONCE AS NEEDED
Status: DISCONTINUED | OUTPATIENT
Start: 2025-01-05 | End: 2025-01-05 | Stop reason: HOSPADM

## 2025-01-05 RX ORDER — VILAZODONE HYDROCHLORIDE 20 MG/1
40 TABLET ORAL DAILY
Status: DISCONTINUED | OUTPATIENT
Start: 2025-01-05 | End: 2025-01-06 | Stop reason: HOSPADM

## 2025-01-05 RX ORDER — MIDAZOLAM HYDROCHLORIDE 2 MG/2ML
2 INJECTION, SOLUTION INTRAMUSCULAR; INTRAVENOUS ONCE
Status: COMPLETED | OUTPATIENT
Start: 2025-01-05 | End: 2025-01-05

## 2025-01-05 RX ORDER — OXYCODONE HYDROCHLORIDE 5 MG/1
5 TABLET ORAL
Status: DISCONTINUED | OUTPATIENT
Start: 2025-01-05 | End: 2025-01-05 | Stop reason: HOSPADM

## 2025-01-05 RX ORDER — PROPOFOL 10 MG/ML
VIAL (ML) INTRAVENOUS AS NEEDED
Status: DISCONTINUED | OUTPATIENT
Start: 2025-01-05 | End: 2025-01-05 | Stop reason: SURG

## 2025-01-05 RX ORDER — PETROLATUM,WHITE
OINTMENT IN PACKET (GRAM) TOPICAL AS NEEDED
Status: DISCONTINUED | OUTPATIENT
Start: 2025-01-05 | End: 2025-01-05 | Stop reason: SURG

## 2025-01-05 RX ORDER — AMITRIPTYLINE HYDROCHLORIDE 50 MG/1
100 TABLET ORAL NIGHTLY PRN
Status: DISCONTINUED | OUTPATIENT
Start: 2025-01-05 | End: 2025-01-06 | Stop reason: HOSPADM

## 2025-01-05 RX ORDER — HYDROCODONE BITARTRATE AND ACETAMINOPHEN 5; 325 MG/1; MG/1
1 TABLET ORAL EVERY 4 HOURS PRN
Status: DISCONTINUED | OUTPATIENT
Start: 2025-01-05 | End: 2025-01-06 | Stop reason: HOSPADM

## 2025-01-05 RX ORDER — FENTANYL CITRATE 50 UG/ML
INJECTION, SOLUTION INTRAMUSCULAR; INTRAVENOUS AS NEEDED
Status: DISCONTINUED | OUTPATIENT
Start: 2025-01-05 | End: 2025-01-05 | Stop reason: SURG

## 2025-01-05 RX ORDER — SODIUM CHLORIDE 9 MG/ML
100 INJECTION, SOLUTION INTRAVENOUS CONTINUOUS
Status: DISCONTINUED | OUTPATIENT
Start: 2025-01-05 | End: 2025-01-05

## 2025-01-05 RX ADMIN — KETOROLAC TROMETHAMINE 15 MG: 30 INJECTION, SOLUTION INTRAMUSCULAR; INTRAVENOUS at 15:05

## 2025-01-05 RX ADMIN — DEXAMETHASONE SODIUM PHOSPHATE 4 MG: 4 INJECTION, SOLUTION INTRAMUSCULAR; INTRAVENOUS at 14:07

## 2025-01-05 RX ADMIN — ONDANSETRON 4 MG: 2 INJECTION INTRAMUSCULAR; INTRAVENOUS at 20:35

## 2025-01-05 RX ADMIN — PROPOFOL 150 MCG/KG/MIN: 10 INJECTION, EMULSION INTRAVENOUS at 14:06

## 2025-01-05 RX ADMIN — PIPERACILLIN AND TAZOBACTAM 3.38 G: 3; .375 INJECTION, POWDER, FOR SOLUTION INTRAVENOUS at 08:04

## 2025-01-05 RX ADMIN — LIDOCAINE HYDROCHLORIDE 60 MG: 20 INJECTION, SOLUTION INTRAVENOUS at 14:05

## 2025-01-05 RX ADMIN — PIPERACILLIN AND TAZOBACTAM 3.38 G: 3; .375 INJECTION, POWDER, FOR SOLUTION INTRAVENOUS at 21:55

## 2025-01-05 RX ADMIN — SODIUM CHLORIDE 100 ML/HR: 9 INJECTION, SOLUTION INTRAVENOUS at 08:04

## 2025-01-05 RX ADMIN — Medication 1 PACKAGE: at 14:19

## 2025-01-05 RX ADMIN — MORPHINE SULFATE 4 MG: 4 INJECTION, SOLUTION INTRAMUSCULAR; INTRAVENOUS at 00:59

## 2025-01-05 RX ADMIN — Medication 10 ML: at 21:00

## 2025-01-05 RX ADMIN — Medication 10 ML: at 08:04

## 2025-01-05 RX ADMIN — Medication 200 MCG: at 14:31

## 2025-01-05 RX ADMIN — PIPERACILLIN AND TAZOBACTAM 3.38 G: 3; .375 INJECTION, POWDER, FOR SOLUTION INTRAVENOUS at 14:32

## 2025-01-05 RX ADMIN — SUGAMMADEX 100 MG: 100 INJECTION, SOLUTION INTRAVENOUS at 15:20

## 2025-01-05 RX ADMIN — VILAZODONE HYDROCHLORIDE 40 MG: 20 TABLET ORAL at 21:56

## 2025-01-05 RX ADMIN — SUGAMMADEX 200 MG: 100 INJECTION, SOLUTION INTRAVENOUS at 15:11

## 2025-01-05 RX ADMIN — SCOPOLAMINE 1 PATCH: 1.5 PATCH, EXTENDED RELEASE TRANSDERMAL at 12:30

## 2025-01-05 RX ADMIN — PROPOFOL 180 MG: 10 INJECTION, EMULSION INTRAVENOUS at 14:05

## 2025-01-05 RX ADMIN — HYDROMORPHONE HYDROCHLORIDE 0.5 MG: 1 INJECTION, SOLUTION INTRAMUSCULAR; INTRAVENOUS; SUBCUTANEOUS at 20:36

## 2025-01-05 RX ADMIN — FENTANYL CITRATE 50 MCG: 50 INJECTION, SOLUTION INTRAMUSCULAR; INTRAVENOUS at 13:59

## 2025-01-05 RX ADMIN — DEXMEDETOMIDINE HYDROCHLORIDE 10 MCG: 100 INJECTION, SOLUTION, CONCENTRATE INTRAVENOUS at 15:01

## 2025-01-05 RX ADMIN — MIDAZOLAM HYDROCHLORIDE 2 MG: 1 INJECTION, SOLUTION INTRAMUSCULAR; INTRAVENOUS at 12:45

## 2025-01-05 RX ADMIN — FENTANYL CITRATE 50 MCG: 50 INJECTION, SOLUTION INTRAMUSCULAR; INTRAVENOUS at 14:02

## 2025-01-05 RX ADMIN — MAGNESIUM SULFATE HEPTAHYDRATE 2 G: 500 INJECTION, SOLUTION INTRAMUSCULAR; INTRAVENOUS at 14:07

## 2025-01-05 RX ADMIN — CLONAZEPAM 0.5 MG: 0.5 TABLET ORAL at 21:56

## 2025-01-05 RX ADMIN — ROCURONIUM BROMIDE 30 MG: 50 INJECTION INTRAVENOUS at 14:34

## 2025-01-05 RX ADMIN — HYDROMORPHONE HYDROCHLORIDE 0.5 MG: 1 INJECTION, SOLUTION INTRAMUSCULAR; INTRAVENOUS; SUBCUTANEOUS at 15:56

## 2025-01-05 RX ADMIN — ROCURONIUM BROMIDE 50 MG: 50 INJECTION INTRAVENOUS at 14:06

## 2025-01-05 RX ADMIN — HYDROMORPHONE HYDROCHLORIDE 0.5 MG: 1 INJECTION, SOLUTION INTRAMUSCULAR; INTRAVENOUS; SUBCUTANEOUS at 23:34

## 2025-01-05 RX ADMIN — INDOCYANINE GREEN AND WATER 2.5 MG: KIT at 13:09

## 2025-01-05 RX ADMIN — PROPOFOL 120 MCG/KG/MIN: 10 INJECTION, EMULSION INTRAVENOUS at 14:31

## 2025-01-05 RX ADMIN — ONDANSETRON 4 MG: 2 INJECTION INTRAMUSCULAR; INTRAVENOUS at 14:57

## 2025-01-05 RX ADMIN — Medication 100 MCG: at 14:22

## 2025-01-05 RX ADMIN — DEXMEDETOMIDINE HYDROCHLORIDE 10 MCG: 100 INJECTION, SOLUTION, CONCENTRATE INTRAVENOUS at 15:04

## 2025-01-05 RX ADMIN — OXYCODONE 5 MG: 5 TABLET ORAL at 15:47

## 2025-01-05 RX ADMIN — SODIUM CHLORIDE: 9 INJECTION, SOLUTION INTRAVENOUS at 15:17

## 2025-01-05 NOTE — PLAN OF CARE
Problem: Adult Inpatient Plan of Care  Goal: Plan of Care Review  Outcome: Progressing  Flowsheets (Taken 1/5/2025 1631)  Progress: no change  Plan of Care Reviewed With: patient  Goal: Patient-Specific Goal (Individualized)  Outcome: Progressing  Goal: Absence of Hospital-Acquired Illness or Injury  Outcome: Progressing  Intervention: Identify and Manage Fall Risk  Recent Flowsheet Documentation  Taken 1/5/2025 1140 by Marin Dawson RN  Safety Promotion/Fall Prevention:   fall prevention program maintained   nonskid shoes/slippers when out of bed   room organization consistent   safety round/check completed   assistive device/personal items within reach   lighting adjusted  Taken 1/5/2025 0809 by Marin Dawson RN  Safety Promotion/Fall Prevention: safety round/check completed  Intervention: Prevent and Manage VTE (Venous Thromboembolism) Risk  Recent Flowsheet Documentation  Taken 1/5/2025 1149 by Marin Dawson RN  VTE Prevention/Management: SCDs (sequential compression devices) on  Intervention: Prevent Infection  Recent Flowsheet Documentation  Taken 1/5/2025 0809 by Marin Dawson RN  Infection Prevention: hand hygiene promoted  Goal: Optimal Comfort and Wellbeing  Outcome: Progressing  Goal: Readiness for Transition of Care  Outcome: Progressing  Intervention: Mutually Develop Transition Plan  Recent Flowsheet Documentation  Taken 1/5/2025 0739 by Marin Dawson RN  Transportation Anticipated: family or friend will provide  Patient/Family Anticipated Services at Transition: none  Patient/Family Anticipates Transition to: home with family  Taken 1/5/2025 0736 by Marin Dawson RN  Equipment Currently Used at Home: none     Problem: Infection  Goal: Absence of Infection Signs and Symptoms  Outcome: Progressing     Problem: Pain Acute  Goal: Optimal Pain Control and Function  Outcome: Progressing     Problem: Cholecystectomy  Goal: Absence of Bleeding  Outcome: Progressing  Goal: Effective Bowel  Elimination  Outcome: Progressing  Goal: Fluid and Electrolyte Balance  Outcome: Progressing  Goal: Absence of Infection Signs and Symptoms  Outcome: Progressing  Goal: Anesthesia/Sedation Recovery  Outcome: Progressing  Intervention: Optimize Anesthesia Recovery  Recent Flowsheet Documentation  Taken 1/5/2025 1140 by Marin Dawson, RN  Safety Promotion/Fall Prevention:   fall prevention program maintained   nonskid shoes/slippers when out of bed   room organization consistent   safety round/check completed   assistive device/personal items within reach   lighting adjusted  Taken 1/5/2025 0809 by Marin Dawson, RN  Safety Promotion/Fall Prevention: safety round/check completed  Goal: Pain Control and Function  Outcome: Progressing  Goal: Nausea and Vomiting Relief  Outcome: Progressing  Goal: Effective Urinary Elimination  Outcome: Progressing  Goal: Effective Oxygenation and Ventilation  Outcome: Progressing   Goal Outcome Evaluation:  Plan of Care Reviewed With: patient        Progress: no change

## 2025-01-05 NOTE — H&P
Patient Care Team:  Kamran Ortega MD as PCP - General (Family Medicine)    Chief complaint upper abdominal pain    Subjective     Patient is a 49 y.o. female presents with upper abdominal pain.  Patient had associated nausea and vomiting 2 days ago however symptoms resolved after 5 hours.  She had no symptoms yesterday.  She now is having right upper quadrant pain.  She tried taking Gas-X to see if it was gas pains however it did not improve.  Her pain is mostly in her right upper abdomen and radiates to the back.  She does not have any history of previous abdominal surgeries.  She was found to have an elevated AST ALT and alkaline phosphatase.  MRCP showed no CBD obstruction.    Review of Systems   Pertinent items are noted in HPI    History  Past Medical History:   Diagnosis Date    Cluster headache 1992    Headache, tension-type 1992    Insomnia     Migraines     Panic disorder      Past Surgical History:   Procedure Laterality Date    COLONOSCOPY  2016    NASAL TURBINATE REDUCTION      SEPTOPLASTY       Family History   Problem Relation Age of Onset    Colon cancer Neg Hx      Social History     Tobacco Use    Smoking status: Never    Smokeless tobacco: Never   Vaping Use    Vaping status: Never Used   Substance Use Topics    Alcohol use: Yes     Alcohol/week: 0.0 standard drinks of alcohol     Comment: SOCIALLY    Drug use: Never     (Not in a hospital admission)   Allergies:  Trazodone    Objective     Vital Signs  Temp:  [97.9 °F (36.6 °C)] 97.9 °F (36.6 °C)  Heart Rate:  [] 111  Resp:  [18] 18  BP: (134-155)/() 134/96    Physical Exam:      General Appearance:  Alert, cooperative, in no acute distress   Head:  Normocephalic, without obvious abnormality, atraumatic   Eyes:  Lids and lashes normal, conjunctivae and sclerae normal, no icterus, no pallor, corneas clear, PERRLA   Ears:  Ears appear intact with no abnormalities noted   Throat:  No oral lesions, no thrush, oral mucosa moist    Neck:  No adenopathy, supple, trachea midline, no thyromegaly, no carotid bruit, no JVD   Back:  No kyphosis present, no scoliosis present, no skin lesions, erythema or scars, no tenderness to percussion, or palpation, range of motion normal   Lungs:  Clear to auscultation,respirations regular, even and unlabored    Heart:  Regular rhythm and normal rate, normal S1 and S2, no murmur, no gallop, no rub, no click   Breast Exam:  Deferred   Abdomen:  Normal bowel sounds, no masses, no organomegaly, soft non-tender, non-distended, no guarding, no rebound tenderness   Genitalia:  Deferred   Extremities:  Moves all extremities well, no edema, no cyanosis, no redness   Pulses:  Pulses palpable and equal bilaterally   Skin:  No bleeding, bruising or rash   Lymph nodes:  No palpable adenopathy   Neurologic:  Cranial nerves 2 - 12 grossly intact, sensation intact, DTR present and equal bilaterally       Results Review:    I reviewed the patient's new clinical results.  I reviewed the patient's new imaging results and agree with the interpretation.  I reviewed the patient's other test results and agree with the interpretation  I personally viewed and interpreted the patient's EKG/Telemetry data    Assessment & Plan       Abdominal pain    Acute cholecystitis      Admit to hospitalist service  Remote telemetry  Pain control  Antiemetics  General Surgery called from emergency department  N.p.o.  IV fluids      Jp Acosta MD  01/05/25  05:12 EST

## 2025-01-05 NOTE — CONSULTS
[facility]   Consult Note    Patient Name: Marybeth Harrison  : 1975  MRN: [id number]  Primary Care Physician:  Kamran Ortega MD  Referring Physician: No ref. provider found  Date of admission: 2025    Consults  Subjective   Subjective     Reason for Consult/ Chief Complaint: Abdominal pain    Abdominal Pain    Back Pain  Associated symptoms include abdominal pain.     Marybeth Harrison is a 49 y.o. female who presented to the emergency room late last night with complaints of upper abdominal pain.  She had a CT scan that showed gallstones and some intrahepatic ductal dilatation.  She subsequently had an MRCP that showed no obvious choledocholithiasis with a normal caliber bile duct of 5 mm.  She is still having some pain today.    Review of Systems   Gastrointestinal:  Positive for abdominal pain.   Musculoskeletal:  Positive for back pain.        Personal History     Past Medical History:   Diagnosis Date   • Cluster headache    • Headache, tension-type    • Insomnia    • Migraines    • Panic disorder        Past Surgical History:   Procedure Laterality Date   • COLONOSCOPY     • NASAL TURBINATE REDUCTION     • SEPTOPLASTY         Family History: family history is not on file. Otherwise pertinent FHx was reviewed and not pertinent to current issue.    Social History:  reports that she has never smoked. She has never used smokeless tobacco. She reports current alcohol use. She reports that she does not use drugs.    Home Medications:   SUMAtriptan, acetaminophen, amitriptyline, clonazePAM, galcanezumab-gnlm, and vilazodone    Allergies:  Allergies   Allergen Reactions   • Trazodone Other (See Comments)     nightmares       Objective    Objective     Vitals:  Temp:  [97.9 °F (36.6 °C)-98.2 °F (36.8 °C)] 98.2 °F (36.8 °C)  Heart Rate:  [] 82  Resp:  [16-18] 16  BP: (134-155)/() 136/95    Physical Exam  Constitutional:       General: She is not in acute distress.      Appearance: She is normal weight. She is not ill-appearing.   Cardiovascular:      Rate and Rhythm: Normal rate.   Pulmonary:      Effort: Pulmonary effort is normal.   Abdominal:      Palpations: Abdomen is soft.      Tenderness: There is abdominal tenderness.   Musculoskeletal:         General: Normal range of motion.      Cervical back: Normal range of motion.   Skin:     General: Skin is warm.   Neurological:      General: No focal deficit present.      Mental Status: She is alert.   Psychiatric:         Mood and Affect: Mood normal.         Result Review    Result Review:  I have personally reviewed the results from the time of this admission to 1/5/2025 07:36 EST and agree with these findings:  [x]  Laboratory list / accordion  []  Microbiology  []  Radiology  []  EKG/Telemetry   []  Cardiology/Vascular   []  Pathology  []  Old records  []  Other:  Most notable findings include: White blood cell count of 10.9      Assessment & Plan   Assessment / Plan     Brief Patient Summary:  Marybeth Harrison is a 49 y.o. female who presents with acute cholecystitis.    Active Hospital Problems:  Active Hospital Problems    Diagnosis    • **Abdominal pain    • Acute cholecystitis      Plan: We will proceed with a robotic cholecystectomy.  I have described the procedure to her as well as the risk and benefits and she is agreeable to proceeding.      Giovani Costello MD

## 2025-01-05 NOTE — PLAN OF CARE
Problem: Adult Inpatient Plan of Care  Goal: Plan of Care Review  1/5/2025 1826 by Marin Dawson RN  Outcome: Progressing  Flowsheets (Taken 1/5/2025 1826)  Progress: no change  Outcome Evaluation: SINCE ARRVAL BACK FROM SURGERY AROUND 1600 . 2 LAP SITES WAS BLEEDING. APPLIED PRESSURE DRESSING ON TOP OF CURRENT DRESSING. RECHECKED IN 30 MINUTES AND BLEEDING STOPPED. PT AGREED TO STAY ONE MORE NIGHT TO WATCH BLOOD PRESSURE AND RECHECK LABS IN THE MORNING.  Plan of Care Reviewed With: patient  1/5/2025 1631 by Marin Dawson RN  Outcome: Progressing  Flowsheets (Taken 1/5/2025 1631)  Progress: no change  Plan of Care Reviewed With: patient  Goal: Patient-Specific Goal (Individualized)  1/5/2025 1826 by Marin Dawson RN  Outcome: Progressing  1/5/2025 1631 by Marin Dawson RN  Outcome: Progressing  Goal: Absence of Hospital-Acquired Illness or Injury  1/5/2025 1826 by Marin Dawson RN  Outcome: Progressing  1/5/2025 1631 by Marin Dawson RN  Outcome: Progressing  Intervention: Identify and Manage Fall Risk  Recent Flowsheet Documentation  Taken 1/5/2025 1140 by Marin Dawson RN  Safety Promotion/Fall Prevention:   fall prevention program maintained   nonskid shoes/slippers when out of bed   room organization consistent   safety round/check completed   assistive device/personal items within reach   lighting adjusted  Taken 1/5/2025 0809 by Marin Dawson RN  Safety Promotion/Fall Prevention: safety round/check completed  Intervention: Prevent and Manage VTE (Venous Thromboembolism) Risk  Recent Flowsheet Documentation  Taken 1/5/2025 1149 by Marin Dawson RN  VTE Prevention/Management: SCDs (sequential compression devices) on  Intervention: Prevent Infection  Recent Flowsheet Documentation  Taken 1/5/2025 0809 by Marin Dawson RN  Infection Prevention: hand hygiene promoted  Goal: Optimal Comfort and Wellbeing  1/5/2025 1826 by Marin Dawson RN  Outcome: Progressing  1/5/2025 1631 by Marin Dawson  RN  Outcome: Progressing  Goal: Readiness for Transition of Care  1/5/2025 1826 by Marin Dawson RN  Outcome: Progressing  1/5/2025 1631 by Marin Dawson RN  Outcome: Progressing  Intervention: Mutually Develop Transition Plan  Recent Flowsheet Documentation  Taken 1/5/2025 0739 by Marin Dawson RN  Transportation Anticipated: family or friend will provide  Patient/Family Anticipated Services at Transition: none  Patient/Family Anticipates Transition to: home with family  Taken 1/5/2025 0736 by Marin Dawson RN  Equipment Currently Used at Home: none     Problem: Infection  Goal: Absence of Infection Signs and Symptoms  1/5/2025 1826 by Marin Dawson RN  Outcome: Progressing  1/5/2025 1631 by Marin Dawson RN  Outcome: Progressing     Problem: Pain Acute  Goal: Optimal Pain Control and Function  1/5/2025 1826 by Marin Dawson RN  Outcome: Progressing  1/5/2025 1631 by Marin Dawson RN  Outcome: Progressing     Problem: Cholecystectomy  Goal: Absence of Bleeding  1/5/2025 1826 by Marin Dawson RN  Outcome: Progressing  1/5/2025 1631 by Marin Dawson RN  Outcome: Progressing  Goal: Effective Bowel Elimination  1/5/2025 1826 by Marin Dawson RN  Outcome: Progressing  1/5/2025 1631 by Marin Dawson RN  Outcome: Progressing  Goal: Fluid and Electrolyte Balance  1/5/2025 1826 by Marin Dawson RN  Outcome: Progressing  1/5/2025 1631 by Marin Dawson RN  Outcome: Progressing  Goal: Absence of Infection Signs and Symptoms  1/5/2025 1826 by Marin Dawson RN  Outcome: Progressing  1/5/2025 1631 by Marin Dawson RN  Outcome: Progressing  Goal: Anesthesia/Sedation Recovery  1/5/2025 1826 by Marin Dawson RN  Outcome: Progressing  1/5/2025 1631 by Marin Dawson RN  Outcome: Progressing  Intervention: Optimize Anesthesia Recovery  Recent Flowsheet Documentation  Taken 1/5/2025 1140 by Marin Dawson RN  Safety Promotion/Fall Prevention:   fall prevention program maintained   nonskid shoes/slippers when out of  bed   room organization consistent   safety round/check completed   assistive device/personal items within reach   lighting adjusted  Taken 1/5/2025 0809 by Marin Dawson RN  Safety Promotion/Fall Prevention: safety round/check completed  Goal: Pain Control and Function  1/5/2025 1826 by Marin Dawson RN  Outcome: Progressing  1/5/2025 1631 by Marin Dawson RN  Outcome: Progressing  Goal: Nausea and Vomiting Relief  1/5/2025 1826 by Marin Dawson RN  Outcome: Progressing  1/5/2025 1631 by Marin Dawson RN  Outcome: Progressing  Goal: Effective Urinary Elimination  1/5/2025 1826 by Marin Dawson RN  Outcome: Progressing  1/5/2025 1631 by Marin Dawson RN  Outcome: Progressing  Goal: Effective Oxygenation and Ventilation  1/5/2025 1826 by Marin Dawson RN  Outcome: Progressing  1/5/2025 1631 by Marin Dawson RN  Outcome: Progressing   Goal Outcome Evaluation:  Plan of Care Reviewed With: patient        Progress: no change  Outcome Evaluation: SINCE ARRVAL BACK FROM SURGERY AROUND 1600 . 2 LAP SITES WAS BLEEDING. APPLIED PRESSURE DRESSING ON TOP OF CURRENT DRESSING. RECHECKED IN 30 MINUTES AND BLEEDING STOPPED. PT AGREED TO STAY ONE MORE NIGHT TO WATCH BLOOD PRESSURE AND RECHECK LABS IN THE MORNING.

## 2025-01-05 NOTE — ANESTHESIA PREPROCEDURE EVALUATION
Anesthesia Evaluation     Patient summary reviewed and Nursing notes reviewed                Airway   Mallampati: I  TM distance: >3 FB  Neck ROM: full  No difficulty expected  Dental      Pulmonary - negative pulmonary ROS and normal exam    breath sounds clear to auscultation  Cardiovascular - negative cardio ROS and normal exam    Rhythm: regular  Rate: normal        Neuro/Psych  (+) headaches, psychiatric history Anxiety  GI/Hepatic/Renal/Endo - negative ROS     Musculoskeletal (-) negative ROS    Abdominal    Substance History - negative use     OB/GYN negative ob/gyn ROS         Other        ROS/Med Hx Other: Panic disorder.              Anesthesia Plan    ASA 1 - emergent     general     intravenous induction     Anesthetic plan, risks, benefits, and alternatives have been provided, discussed and informed consent has been obtained with: patient.    CODE STATUS:    Code Status (Patient has no pulse and is not breathing): CPR (Attempt to Resuscitate)  Medical Interventions (Patient has pulse or is breathing): Full Support

## 2025-01-05 NOTE — ED PROVIDER NOTES
Time: 9:45 PM EST  Date of encounter:  1/4/2025  Independent Historian/Clinical History and Information was obtained by:   Patient    History is limited by: N/A    Chief Complaint   Patient presents with    Abdominal Pain    Back Pain     PT REPORTS ABDOMINAL PAIN THAT RADIATES THROUGH TO BACK. PT ALSO REPORTS NAUSEA. SYMPTOMS STARTED A COUPLE DAYS AGO AND CONTINUE TO WORSEN.         History of Present Illness:    The patient is a 49 y.o. year old female who presents to the emergency department for evaluation of upper abdominal pain.  The patient states that it began a day before yesterday with the pain and nausea and vomiting.  She states that she was able to stay home and after about 5 hours the symptoms all resolved.  She states that yesterday she had no symptoms at all.  She states she has been trying to stay away from carbonated drinks and things that she thinks might of caused the pain previously.  She states that she did takes Gas-X and things trying to see if it was gas pains but today it started in her upper back and then come around to her upper abdomen. The patient denies dysuria, vaginal discharge, new medications.  She reports no previous abdominal surgeries.  She reports no history of hepatitis or any elevated LFTs in the past.      (Bailey Seaver, PADMINI, FNP-C)    Patient Care Team  Primary Care Provider: Kamran Ortega MD    Past Medical History:     Allergies   Allergen Reactions    Trazodone Other (See Comments)     nightmares     Past Medical History:   Diagnosis Date    Cluster headache 1992    Headache, tension-type 1992    Insomnia     Migraines     Panic disorder      Past Surgical History:   Procedure Laterality Date    COLONOSCOPY  2016    NASAL TURBINATE REDUCTION      SEPTOPLASTY       Family History   Problem Relation Age of Onset    Colon cancer Neg Hx        Home Medications:  Prior to Admission medications    Medication Sig Start Date End Date Taking? Authorizing Provider    acetaminophen (TYLENOL) 500 MG tablet Take 1 tablet by mouth Every 6 (Six) Hours As Needed for Mild Pain. 10/23/23   Wade Green MD   amitriptyline (ELAVIL) 100 MG tablet  8/8/23   Swapna Aguirre MD   amitriptyline (ELAVIL) 50 MG tablet amitriptyline 50 mg tablet   TAKE 1 TABLET BY MOUTH EVERYDAY AT BEDTIME    Swapna Aguirre MD   clonazePAM (KlonoPIN) 1 MG tablet Take 1 mg by mouth 2 (Two) Times a Day As Needed. 4/24/21   Swapna Aguirre MD   diclofenac (VOLTAREN) 75 MG EC tablet Take 1 tablet by mouth 2 (Two) Times a Day. 10/23/23   Wade Green MD   galcanezumab-gnlm (Emgality) 120 MG/ML auto-injector pen Inject 1 mL under the skin into the appropriate area as directed Every 30 (Thirty) Days. 8/15/23   Kassandra Moyer APRN   methocarbamol (ROBAXIN) 500 MG tablet Take 2 tablets by mouth 4 (Four) Times a Day. 10/23/23   Wade Green MD   SUMAtriptan (IMITREX) 20 MG/ACT nasal spray INSTILL 1 SPRAY INTO THE NOSTIL. MAY REPEAT ONCE IN 2 HRS IF NEEDED 9/10/23   Swapna Aguirre MD   vilazodone (VIIBRYD) 10 MG tablet tablet 20 mg Daily.    Swapna Aguirre MD        Social History:   Social History     Tobacco Use    Smoking status: Never    Smokeless tobacco: Never   Vaping Use    Vaping status: Never Used   Substance Use Topics    Alcohol use: Yes     Alcohol/week: 0.0 standard drinks of alcohol     Comment: SOCIALLY    Drug use: Never         Review of Systems:  Review of Systems   Constitutional:  Negative for chills and fever.   HENT:  Negative for congestion, ear pain and sore throat.    Eyes:  Negative for pain.   Respiratory:  Negative for cough, chest tightness and shortness of breath.    Cardiovascular:  Negative for chest pain.   Gastrointestinal:  Positive for abdominal pain (Upper), nausea and vomiting. Negative for diarrhea.   Genitourinary:  Negative for dysuria, flank pain, frequency, hematuria and urgency.   Musculoskeletal:  Positive for back pain (Upper).  "Negative for joint swelling, neck pain and neck stiffness.   Skin:  Negative for pallor and rash.   Neurological:  Negative for seizures and headaches.   All other systems reviewed and are negative.       Physical Exam:  /95   Pulse 82   Temp 97.9 °F (36.6 °C) (Oral)   Resp 18   Ht 160 cm (63\")   Wt 53.8 kg (118 lb 9.7 oz)   LMP 12/21/2024 (Approximate)   SpO2 96%   BMI 21.01 kg/m²         Physical Exam  Vitals and nursing note reviewed.   Constitutional:       General: She is not in acute distress.     Appearance: Normal appearance. She is well-developed. She is not ill-appearing or toxic-appearing.   HENT:      Head: Normocephalic and atraumatic.   Eyes:      General: No scleral icterus.     Pupils: Pupils are equal, round, and reactive to light.   Cardiovascular:      Rate and Rhythm: Normal rate and regular rhythm.      Pulses: Normal pulses.   Pulmonary:      Effort: Pulmonary effort is normal. No respiratory distress.   Abdominal:      General: Abdomen is flat. There is no distension.      Palpations: Abdomen is soft.      Tenderness: There is no abdominal tenderness in the right upper quadrant and epigastric area. There is no guarding or rebound.   Musculoskeletal:         General: Normal range of motion.      Cervical back: Normal range of motion and neck supple.   Skin:     General: Skin is warm and dry.      Capillary Refill: Capillary refill takes less than 2 seconds.      Findings: No rash.   Neurological:      General: No focal deficit present.      Mental Status: She is alert and oriented to person, place, and time. Mental status is at baseline.   Psychiatric:         Mood and Affect: Mood normal.         Behavior: Behavior normal.                            Medical Decision Making:      Comorbidities that affect care:    Migraines, insomnia, tension headaches, panic disorder, cluster headaches    External Notes reviewed:    Previous Clinic Note: Seen at the urgent care for nausea and " vomiting on 12/7/2023      The following orders were placed and all results were independently analyzed by me:  Orders Placed This Encounter   Procedures    CT Abdomen Pelvis With Contrast    MRI abdomen wo contrast mrcp    [place] Draw    Comprehensive Metabolic Panel    Lipase    Urinalysis With Microscopic If Indicated (No Culture) - Urine, Clean Catch    hCG, Quantitative, Pregnancy    CBC Auto Differential    Hepatitis Panel, Acute    Protime-INR    NPO Diet NPO Type: Strict NPO    Undress & Gown    Code Status and Medical Interventions: CPR (Attempt to Resuscitate); Full Support    IP Consult to General Surgery    Inpatient Hospitalist Consult    Insert Peripheral IV    Inpatient Admission    CBC & Differential    Green Top (Gel)    Lavender Top    Gold Top - SST    Light Blue Top       Medications Given in the Emergency Department:  Medications   sodium chloride 0.9 % flush 10 mL (has no administration in time range)   sodium chloride 0.9 % bolus 1,000 mL (0 mL Intravenous Stopped 1/5/25 0058)   ondansetron (ZOFRAN) injection 4 mg (4 mg Intravenous Given 1/4/25 2348)   dicyclomine (BENTYL) capsule 40 mg (40 mg Oral Given 1/4/25 2350)   ketorolac (TORADOL) injection 30 mg (30 mg Intravenous Given 1/4/25 2349)   iopamidol (ISOVUE-370) 76 % injection 100 mL (100 mL Intravenous Given 1/4/25 2341)   morphine injection 4 mg (4 mg Intravenous Given 1/5/25 0059)        ED Course:    The patient was initially evaluated in the triage area where orders were placed. The patient was later dispositioned by PADMINI Hoang.      The patient was advised to stay for completion of workup which includes but is not limited to communication of labs and radiological results, reassessment and plan. The patient was advised that leaving prior to disposition by a provider could result in critical findings that are not communicated to the patient.     ED Course as of 01/05/25 0703   Sat Jan 04, 2025   2146 PROVIDER IN  "TRIAGE  Patient was evaluated by me in triage, Bailey Seaver, APRN, FNP-C.  Orders were placed and patient is currently awaiting final results and disposition.   [AS]   Sun Jan 05, 2025   0128 I spoke with Dr. Costello and he reviewed the patient's test results and feels that the patient will require an MRCP before he will consult.  He recommended admission and MRCP [TC]   0139 Dr. Montoya and myself spoke with Dr. Catalan with the hospitalist service.  He is requesting that the patient be transferred for a MRCP.  We discussed with the hospitalist that we cannot transfer patient for a \"what if\" diagnoses.  He is now requesting the MRCP tonight before admission here for the patient.  We will call in the MRI tech to perform the MRCP tonight in the emergency department prior to admission.  The patient was made aware of the additional test and delay of admission. [TC]   0207 Dr. Christiano Wang spoke with Dr. Andrews the radiologist and the MRI will be called in for the nonemergent MRCP. [TC]   0454 The patient's MRCP was negative for any stones and I spoke with Dr. Mendoza who will admit the patient to the medicine and consult with surgery today.  The patient was made aware of the pending admission. [TC]      ED Course User Index  [AS] Seaver, Alyce B, APRN  [TC] Dione Serrano APRN       Labs:    Lab Results (last 24 hours)       Procedure Component Value Units Date/Time    CBC & Differential [[id number]]  (Abnormal) Collected: 01/04/25 2155    Specimen: Blood from Arm, Right Updated: 01/04/25 2206    Narrative:      The following orders were created for panel order CBC & Differential.  Procedure                               Abnormality         Status                     ---------                               -----------         ------                     CBC Auto Differential[[id number]]        Abnormal            Final result                 Please view results for these tests on the individual orders.    " Comprehensive Metabolic Panel [[id number]]  (Abnormal) Collected: 01/04/25 2155    Specimen: Blood from Arm, Right Updated: 01/04/25 2229     Glucose 134 mg/dL      BUN 7 mg/dL      Creatinine 1.07 mg/dL      Sodium 139 mmol/L      Potassium 3.9 mmol/L      Chloride 104 mmol/L      CO2 26.6 mmol/L      Calcium 9.1 mg/dL      Total Protein 7.0 g/dL      Albumin 4.1 g/dL      ALT (SGPT) 295 U/L      AST (SGOT) 218 U/L      Alkaline Phosphatase 199 U/L      Total Bilirubin 1.2 mg/dL      Globulin 2.9 gm/dL      A/G Ratio 1.4 g/dL      BUN/Creatinine Ratio 6.5     Anion Gap 8.4 mmol/L      eGFR 64.6 mL/min/1.73     Narrative:      GFR Categories in Chronic Kidney Disease (CKD)      GFR Category          GFR (mL/min/1.73)    Interpretation  G1                     90 or greater         Normal or high (1)  G2                      60-89                Mild decrease (1)  G3a                   45-59                Mild to moderate decrease  G3b                   30-44                Moderate to severe decrease  G4                    15-29                Severe decrease  G5                    14 or less           Kidney failure          (1)In the absence of evidence of kidney disease, neither GFR category G1 or G2 fulfill the criteria for CKD.    eGFR calculation 2021 CKD-EPI creatinine equation, which does not include race as a factor    Lipase [[id number]]  (Normal) Collected: 01/04/25 2155    Specimen: Blood from Arm, Right Updated: 01/04/25 2229     Lipase 26 U/L     hCG, Quantitative, Pregnancy [[id number]] Collected: 01/04/25 2155    Specimen: Blood from Arm, Right Updated: 01/04/25 2226     HCG Quantitative <0.50 mIU/mL     Narrative:      HCG Ranges by Gestational Age    Females - non-pregnant premenopausal   </= 1mIU/mL HCG  Females - postmenopausal               </= 7mIU/mL HCG    3 Weeks       5.4   -      72 mIU/mL  4 Weeks      10.2   -     708 mIU/mL  5 Weeks       217   -   8,245 mIU/mL  6 Weeks       152   -   32,177 mIU/mL  7 Weeks     4,059   - 153,767 mIU/mL  8 Weeks    31,366   - 149,094 mIU/mL  9 Weeks    59,109   - 135,901 mIU/mL  10 Weeks   44,186   - 170,409 mIU/mL  12 Weeks   27,107   - 201,615 mIU/mL  14 Weeks   24,302   -  93,646 mIU/mL  15 Weeks   12,540   -  69,747 mIU/mL  16 Weeks    8,904   -  55,332 mIU/mL  17 Weeks    8,240   -  51,793 mIU/mL  18 Weeks    9,649   -  55,271 mIU/mL      CBC Auto Differential [[id number]]  (Abnormal) Collected: 01/04/25 2155    Specimen: Blood from Arm, Right Updated: 01/04/25 2206     WBC 10.96 10*3/mm3      RBC 4.08 10*6/mm3      Hemoglobin 12.0 g/dL      Hematocrit 37.2 %      MCV 91.2 fL      MCH 29.4 pg      MCHC 32.3 g/dL      RDW 12.3 %      RDW-SD 40.8 fl      MPV 10.4 fL      Platelets 269 10*3/mm3      Neutrophil % 77.2 %      Lymphocyte % 15.8 %      Monocyte % 5.1 %      Eosinophil % 1.2 %      Basophil % 0.5 %      Immature Grans % 0.2 %      Neutrophils, Absolute 8.47 10*3/mm3      Lymphocytes, Absolute 1.73 10*3/mm3      Monocytes, Absolute 0.56 10*3/mm3      Eosinophils, Absolute 0.13 10*3/mm3      Basophils, Absolute 0.05 10*3/mm3      Immature Grans, Absolute 0.02 10*3/mm3      nRBC 0.0 /100 WBC     Protime-INR [[id number]]  (Normal) Collected: 01/04/25 2155    Specimen: Blood from Arm, Right Updated: 01/04/25 2240     Protime 12.9 Seconds      INR 0.95    Narrative:      Suggested Therapeutic Ranges For Oral Anticoagulant Therapy:  Level of Therapy                      INR Target Range  Standard Dose                            2.0-3.0  High Dose                                2.5-3.5  Patients not receiving anticoagulant  Therapy Normal Range                     0.86-1.15    Urinalysis With Microscopic If Indicated (No Culture) - Urine, Clean Catch [[id number]]  (Abnormal) Collected: 01/04/25 2157    Specimen: Urine, Clean Catch Updated: 01/04/25 2209     Color, UA Dark Yellow     Appearance, UA Clear     pH, UA 6.5     Specific Gravity, UA 1.020     Glucose,  UA Negative     Ketones, UA Negative     Bilirubin, UA Negative     Blood, UA Negative     Protein, UA Negative     Leuk Esterase, UA Negative     Nitrite, UA Negative     Urobilinogen, UA 1.0 E.U./dL    Narrative:      Urine microscopic not indicated.             Imaging:    MRI abdomen wo contrast mrcp    Result Date: 1/5/2025  MRI ABDOMEN WO CONTRAST MRCP Date of Exam: 1/5/2025 2:45 AM EST Indication: Abnormal CT with findings concerning for cholecystitis. Mild intrahepatic biliary ductal dilatation was present. Epigastric pain.  Comparison: CT abdomen pelvis 1/4/2025 Technique:  Routine multiplanar/multisequence images of the abdomen were obtained with MRCP sequences without contrast administration. Findings: There is some motion degradation. The gallbladder contains small gallstones. There is pericholecystic fluid and inflammatory change. Common bile duct remains normal in caliber measuring about 5 mm on the current study. No definite choledocholithiasis is identified. No significant intrahepatic biliary ductal dilatation is seen currently. No pancreatic ductal dilatation. The kidneys are nonobstructed. There is nothing to suggest acute pancreatitis. The adrenal glands, spleen, and liver are within normal limits.     1.There is pericholecystic fluid and inflammatory change concerning for acute cholecystitis. Cholelithiasis is present. 2.Common bile duct remains normal in caliber measuring about 5 mm on the current study. No definite choledocholithiasis is identified. No significant intrahepatic biliary ductal dilatation is seen currently. Electronically Signed: Miguel Andrews MD  1/5/2025 3:44 AM EST  Workstation ID: [id number]    CT Abdomen Pelvis With Contrast    Result Date: 1/5/2025  CT ABDOMEN PELVIS W CONTRAST Date of Exam: 1/4/2025 11:39 PM EST Indication: ABDOMINAL PAIN. Comparison: None available. Technique: Axial CT images were obtained of the abdomen and pelvis after the uneventful intravenous  administration of iodinated contrast. Reconstructed coronal and sagittal images were also obtained. Automated exposure control and iterative construction methods were used. Findings: Lung bases are clear. Abdominal aorta is normal. The gallbladder is distended, and it contains small stones. There is pericholecystic fluid, and findings are concerning for cholecystitis. Additionally, there is mild intrahepatic biliary ductal dilatation. The common bile duct is not significantly dilated at 8 mm. No definite obstructing stone. The pancreatic duct is normal. There is some focal fatty infiltration in the medial segment of the left hepatic lobe. Solid abdominal organs are otherwise normal. The kidneys are nonobstructed. Urinary bladder is decompressed and not well characterized. There is a 1.5 cm left ovarian cyst. Solid pelvic organs are otherwise normal. The appendix is normal. There is mild colonic diverticulosis without diverticulitis or colitis. No small bowel obstruction is identified. Stomach is distended with food debris and some air but otherwise is normal. No adenopathy. There is a trace amount of dependent free fluid in the pelvis that may be physiologic or reactive.     1.Distended gallbladder containing small stones with pericholecystic fluid. Findings are concerning for cholecystitis. Gallbladder ultrasound should be considered. 2.Mild intrahepatic biliary ductal dilatation. The common bile duct is not significantly dilated at 8 mm. No definite obstructing stone. 3.1.5 cm left ovarian cyst. 4.Trace amount of dependent free fluid in the pelvis that may be physiologic or reactive. 5.Distended stomach. No bowel obstruction. Normal appendix. Electronically Signed: Miguel Andrews MD  1/5/2025 12:24 AM EST  Workstation ID: [id number]       Differential Diagnosis and Discussion:      Abdominal Pain: Based on the patient's signs and symptoms, I considered abdominal aortic aneurysm, small bowel obstruction,  pancreatitis, acute cholecystitis, acute appendecitis, peptic ulcer disease, gastritis, colitis, endocrine disorders, irritable bowel syndrome and other differential diagnosis an etiology of the patient's abdominal pain.    PROCEDURES:    Labs were collected in the emergency department and all labs were reviewed and interpreted by me.  CT scan was performed in the emergency department and the CT scan radiology impression was interpreted by me.  MRI was performed in the emergency department and the MRI impression was interpreted by me.     No orders to display        Procedures    MDM     Patient Care Considerations:    SEPSIS was considered but is NOT present in the emergency department as SIRS criteria is not present.      Consultants/Shared Management Plan:    Hospitalist: I have discussed the case with Dr. Mendoza who agrees to accept the patient for admission.  Consultant: I have discussed the case with Dr. Costello who states requested a MRCP before he would consult on the patient.    Social Determinants of Health:    Patient is independent, reliable, and has access to care.       Disposition and Care Coordination:    Admit:   Through independent evaluation of the patient's history, physical, and imperical data, the patient meets criteria for inpatient admission to the hospital.      Final diagnoses:   Acute cholecystitis   Calculus of gallbladder with acute cholecystitis without obstruction   Elevated LFTs        ED Disposition       ED Disposition   Decision to Admit    Condition   --    Comment   Level of Care: Remote Telemetry [26]   Diagnosis: Abdominal pain [[id number]]   Certification: I Certify That Inpatient Hospital Services Are Medically Necessary For Greater Than 2 Midnights                 This medical record created using voice recognition software.             Dione Serrano APRN  01/05/25 0701       Dione Serrano APRN  01/05/25 0703

## 2025-01-05 NOTE — ED PROVIDER NOTES
"SHARED VISIT NOTE:    Patient is 49 y.o. year old female that presents to the ED for evaluation of ruq pain.     Physical Exam    ED Course:    /96   Pulse 111   Temp 97.9 °F (36.6 °C) (Oral)   Resp 18   Ht 160 cm (63\")   Wt 53.8 kg (118 lb 9.7 oz)   LMP 12/21/2024 (Approximate)   SpO2 100%   BMI 21.01 kg/m²   Results for orders placed or performed during the hospital encounter of 01/04/25   Comprehensive Metabolic Panel    Collection Time: 01/04/25  9:55 PM    Specimen: Arm, Right; Blood   Result Value Ref Range    Glucose 134 (H) 65 - 99 mg/dL    BUN 7 6 - 20 mg/dL    Creatinine 1.07 (H) 0.57 - 1.00 mg/dL    Sodium 139 136 - 145 mmol/L    Potassium 3.9 3.5 - 5.2 mmol/L    Chloride 104 98 - 107 mmol/L    CO2 26.6 22.0 - 29.0 mmol/L    Calcium 9.1 8.6 - 10.5 mg/dL    Total Protein 7.0 6.0 - 8.5 g/dL    Albumin 4.1 3.5 - 5.2 g/dL    ALT (SGPT) 295 (H) 1 - 33 U/L    AST (SGOT) 218 (H) 1 - 32 U/L    Alkaline Phosphatase 199 (H) 39 - 117 U/L    Total Bilirubin 1.2 0.0 - 1.2 mg/dL    Globulin 2.9 gm/dL    A/G Ratio 1.4 g/dL    BUN/Creatinine Ratio 6.5 (L) 7.0 - 25.0    Anion Gap 8.4 5.0 - 15.0 mmol/L    eGFR 64.6 >60.0 mL/min/1.73   Lipase    Collection Time: 01/04/25  9:55 PM    Specimen: Arm, Right; Blood   Result Value Ref Range    Lipase 26 13 - 60 U/L   hCG, Quantitative, Pregnancy    Collection Time: 01/04/25  9:55 PM    Specimen: Arm, Right; Blood   Result Value Ref Range    HCG Quantitative <0.50 mIU/mL   CBC Auto Differential    Collection Time: 01/04/25  9:55 PM    Specimen: Arm, Right; Blood   Result Value Ref Range    WBC 10.96 (H) 3.40 - 10.80 10*3/mm3    RBC 4.08 3.77 - 5.28 10*6/mm3    Hemoglobin 12.0 12.0 - 15.9 g/dL    Hematocrit 37.2 34.0 - 46.6 %    MCV 91.2 79.0 - 97.0 fL    MCH 29.4 26.6 - 33.0 pg    MCHC 32.3 31.5 - 35.7 g/dL    RDW 12.3 12.3 - 15.4 %    RDW-SD 40.8 37.0 - 54.0 fl    MPV 10.4 6.0 - 12.0 fL    Platelets 269 140 - 450 10*3/mm3    Neutrophil % 77.2 (H) 42.7 - 76.0 %    " Lymphocyte % 15.8 (L) 19.6 - 45.3 %    Monocyte % 5.1 5.0 - 12.0 %    Eosinophil % 1.2 0.3 - 6.2 %    Basophil % 0.5 0.0 - 1.5 %    Immature Grans % 0.2 0.0 - 0.5 %    Neutrophils, Absolute 8.47 (H) 1.70 - 7.00 10*3/mm3    Lymphocytes, Absolute 1.73 0.70 - 3.10 10*3/mm3    Monocytes, Absolute 0.56 0.10 - 0.90 10*3/mm3    Eosinophils, Absolute 0.13 0.00 - 0.40 10*3/mm3    Basophils, Absolute 0.05 0.00 - 0.20 10*3/mm3    Immature Grans, Absolute 0.02 0.00 - 0.05 10*3/mm3    nRBC 0.0 0.0 - 0.2 /100 WBC   Protime-INR    Collection Time: 01/04/25  9:55 PM    Specimen: Arm, Right; Blood   Result Value Ref Range    Protime 12.9 11.8 - 14.9 Seconds    INR 0.95 0.86 - 1.15   Green Top (Gel)    Collection Time: 01/04/25  9:55 PM   Result Value Ref Range    Extra Tube Hold for add-ons.    Lavender Top    Collection Time: 01/04/25  9:55 PM   Result Value Ref Range    Extra Tube hold for add-on    Gold Top - SST    Collection Time: 01/04/25  9:55 PM   Result Value Ref Range    Extra Tube Hold for add-ons.    Light Blue Top    Collection Time: 01/04/25  9:55 PM   Result Value Ref Range    Extra Tube Hold for add-ons.    Urinalysis With Microscopic If Indicated (No Culture) - Urine, Clean Catch    Collection Time: 01/04/25  9:57 PM    Specimen: Urine, Clean Catch   Result Value Ref Range    Color, UA Dark Yellow (A) Yellow, Straw    Appearance, UA Clear Clear    pH, UA 6.5 5.0 - 8.0    Specific Gravity, UA 1.020 1.005 - 1.030    Glucose, UA Negative Negative    Ketones, UA Negative Negative    Bilirubin, UA Negative Negative    Blood, UA Negative Negative    Protein, UA Negative Negative    Leuk Esterase, UA Negative Negative    Nitrite, UA Negative Negative    Urobilinogen, UA 1.0 E.U./dL 0.2 - 1.0 E.U./dL     Medications   sodium chloride 0.9 % flush 10 mL (has no administration in time range)   sodium chloride 0.9 % bolus 1,000 mL (0 mL Intravenous Stopped 1/5/25 0058)   ondansetron (ZOFRAN) injection 4 mg (4 mg Intravenous  Given 1/4/25 2348)   dicyclomine (BENTYL) capsule 40 mg (40 mg Oral Given 1/4/25 2350)   ketorolac (TORADOL) injection 30 mg (30 mg Intravenous Given 1/4/25 2349)   iopamidol (ISOVUE-370) 76 % injection 100 mL (100 mL Intravenous Given 1/4/25 2341)   morphine injection 4 mg (4 mg Intravenous Given 1/5/25 0059)     MRI abdomen wo contrast mrcp    Result Date: 1/5/2025  Narrative: MRI ABDOMEN WO CONTRAST MRCP Date of Exam: 1/5/2025 2:45 AM EST Indication: Abnormal CT with findings concerning for cholecystitis. Mild intrahepatic biliary ductal dilatation was present. Epigastric pain.  Comparison: CT abdomen pelvis 1/4/2025 Technique:  Routine multiplanar/multisequence images of the abdomen were obtained with MRCP sequences without contrast administration. Findings: There is some motion degradation. The gallbladder contains small gallstones. There is pericholecystic fluid and inflammatory change. Common bile duct remains normal in caliber measuring about 5 mm on the current study. No definite choledocholithiasis is identified. No significant intrahepatic biliary ductal dilatation is seen currently. No pancreatic ductal dilatation. The kidneys are nonobstructed. There is nothing to suggest acute pancreatitis. The adrenal glands, spleen, and liver are within normal limits.     Impression: 1.There is pericholecystic fluid and inflammatory change concerning for acute cholecystitis. Cholelithiasis is present. 2.Common bile duct remains normal in caliber measuring about 5 mm on the current study. No definite choledocholithiasis is identified. No significant intrahepatic biliary ductal dilatation is seen currently. Electronically Signed: Miguel Andrews MD  1/5/2025 3:44 AM EST  Workstation ID: [id number]    CT Abdomen Pelvis With Contrast    Result Date: 1/5/2025  Narrative: CT ABDOMEN PELVIS W CONTRAST Date of Exam: 1/4/2025 11:39 PM EST Indication: ABDOMINAL PAIN. Comparison: None available. Technique: Axial CT images were  obtained of the abdomen and pelvis after the uneventful intravenous administration of iodinated contrast. Reconstructed coronal and sagittal images were also obtained. Automated exposure control and iterative construction methods were used. Findings: Lung bases are clear. Abdominal aorta is normal. The gallbladder is distended, and it contains small stones. There is pericholecystic fluid, and findings are concerning for cholecystitis. Additionally, there is mild intrahepatic biliary ductal dilatation. The common bile duct is not significantly dilated at 8 mm. No definite obstructing stone. The pancreatic duct is normal. There is some focal fatty infiltration in the medial segment of the left hepatic lobe. Solid abdominal organs are otherwise normal. The kidneys are nonobstructed. Urinary bladder is decompressed and not well characterized. There is a 1.5 cm left ovarian cyst. Solid pelvic organs are otherwise normal. The appendix is normal. There is mild colonic diverticulosis without diverticulitis or colitis. No small bowel obstruction is identified. Stomach is distended with food debris and some air but otherwise is normal. No adenopathy. There is a trace amount of dependent free fluid in the pelvis that may be physiologic or reactive.     Impression: 1.Distended gallbladder containing small stones with pericholecystic fluid. Findings are concerning for cholecystitis. Gallbladder ultrasound should be considered. 2.Mild intrahepatic biliary ductal dilatation. The common bile duct is not significantly dilated at 8 mm. No definite obstructing stone. 3.1.5 cm left ovarian cyst. 4.Trace amount of dependent free fluid in the pelvis that may be physiologic or reactive. 5.Distended stomach. No bowel obstruction. Normal appendix. Electronically Signed: Miguel Andrews MD  1/5/2025 12:24 AM EST  Workstation ID: [id number]     MDM:    Procedures    Labs were collected in the emergency department and all labs were reviewed and  interpreted by me.  CT scan was performed in the emergency department and the CT scan radiology impression was interpreted by me.  Ultrasound was performed in the emergency department and the ultrasound impression was interpreted by me.           SHARED VISIT ATTESTATION:    This visit was performed by both myself and an APC.  I performed the substantive portion of the medical decision making. The management plan was made or approved by me, and I take responsibility for patient management.           Kierra Montoya MD  05:02 EST  01/05/25         Kierra Montoya MD  01/07/25 0477

## 2025-01-05 NOTE — OP NOTE
CHOLECYSTECTOMY LAPAROSCOPIC WITH DAVINCI ROBOT  Procedure Report    Patient Name:  Marybeth Harrison  YOB: 1975    Date of Surgery:  1/5/2025     Indications: The patient is a 49-year-old female who presented to the hospital last night with acute cholecystitis.  The decision was made to proceed with a robotic cholecystectomy.    Pre-op Diagnosis: Acute cholecystitis    Post-Op Diagnosis: Same    Procedure/CPT® Codes:    Robotic cholecystectomy    Staff:  Surgeon(s):  Giovani Costello MD    Assistant: Shanice Zavaleta CRNFA; Sanket Beard RN    Anesthesia: General    Estimated Blood Loss: minimal    Implants:    Implant Name Type Inv. Item Serial No.  Lot No. LRB No. Used Action   CARTRDG CLIP LIGAT VASQCLIP [id number] MD/LG STRL - [id number] Implant CARTRDG CLIP LIGAT VASQCLIP [id number] MD/LG STRL  [employer] [id number] N/A 1 Implanted       Specimen:          Specimens       ID Source Type Tests Collected By Collected At Frozen?    A Gallbladder Tissue TISSUE PATHOLOGY EXAM   Giovani Costello MD 1/5/25 1441                 Findings: Acute cholecystitis    Complications: None    Description of Procedure: The patient was taken the operating room and placed on the table in supine position.  After induction of general anesthesia, the abdomen was prepped and draped sterilely.  The abdomen was insufflated with a Veress needle and a 5 mm right flank port was placed in the peritoneal cavity using a Visiport.  Two 8 mm da Thierry robotic trocars and one 12 mm da Thierry robotic trocar were then placed in the abdomen.  The da Thierry robot was then brought to the table and the robotic arms were docked to the trocars.  The camera and instruments were then placed.  The dome of the gallbladder was grasped by the assistant and retracted cephalad.  The infundibulum was grasped and retracted laterally.  I then dissected the gallbladder cystic duct junction and obtained a critical view of  safety.  The camera was switched over to firefly mode and we clearly saw the cystic duct coming up into the infundibulum of the gallbladder and we identified the common bile duct.  The cystic duct was then clipped twice proximally and once distally with Hem-o-parviz clips and then divided with the cutting current of the cautery.  We then dissected posteriorly and dissected the cystic artery.  It was clipped twice proximally and divided distally with cautery.  We then dissected the gallbladder free from the gallbladder fossa with cautery and placed it in an Endopouch bag.  We had good hemostasis.  At this point we removed the instruments from the abdomen and undocked the robotic arms from the trocars.  The gallbladder was removed and the 12 mm port site was closed with a Nicanor-Hui closure device and a 0 Mersilene tie.  After desufflated the abdomen the other ports were removed.  All skin incisions were closed with 4-0 Vicryl subcuticular sutures.  Sterile dressings were applied and she was taken the postanesthesia recovery room in stable condition.    Assistant: Shanice Zavaleta CRNFA; Sanket Beard, ALTAF  was responsible for performing the following activities: Retraction, Suturing, Placing Dressing, and Held/Positioned Camera and their skilled assistance was necessary for the success of this case.    Giovani Costello MD     Date: 1/5/2025  Time: 15:16 EST

## 2025-01-06 VITALS
DIASTOLIC BLOOD PRESSURE: 97 MMHG | RESPIRATION RATE: 18 BRPM | HEART RATE: 87 BPM | WEIGHT: 116.84 LBS | HEIGHT: 64 IN | OXYGEN SATURATION: 100 % | TEMPERATURE: 98.4 F | BODY MASS INDEX: 19.95 KG/M2 | SYSTOLIC BLOOD PRESSURE: 146 MMHG

## 2025-01-06 PROBLEM — Z90.49 S/P LAPAROSCOPIC CHOLECYSTECTOMY: Status: ACTIVE | Noted: 2025-01-06

## 2025-01-06 LAB
ALBUMIN SERPL-MCNC: 3.5 G/DL (ref 3.5–5.2)
ALP SERPL-CCNC: 209 U/L (ref 39–117)
ALT SERPL W P-5'-P-CCNC: 256 U/L (ref 1–33)
ANION GAP SERPL CALCULATED.3IONS-SCNC: 8.7 MMOL/L (ref 5–15)
AST SERPL-CCNC: 139 U/L (ref 1–32)
BASOPHILS # BLD AUTO: 0.02 10*3/MM3 (ref 0–0.2)
BASOPHILS NFR BLD AUTO: 0.2 % (ref 0–1.5)
BILIRUB CONJ SERPL-MCNC: 0.4 MG/DL (ref 0–0.3)
BILIRUB INDIRECT SERPL-MCNC: 0.5 MG/DL
BILIRUB SERPL-MCNC: 0.9 MG/DL (ref 0–1.2)
BUN SERPL-MCNC: 4 MG/DL (ref 6–20)
BUN/CREAT SERPL: 5.3 (ref 7–25)
CALCIUM SPEC-SCNC: 8.8 MG/DL (ref 8.6–10.5)
CHLORIDE SERPL-SCNC: 101 MMOL/L (ref 98–107)
CO2 SERPL-SCNC: 22.3 MMOL/L (ref 22–29)
CREAT SERPL-MCNC: 0.75 MG/DL (ref 0.57–1)
DEPRECATED RDW RBC AUTO: 40 FL (ref 37–54)
EGFRCR SERPLBLD CKD-EPI 2021: 99 ML/MIN/1.73
EOSINOPHIL # BLD AUTO: 0 10*3/MM3 (ref 0–0.4)
EOSINOPHIL NFR BLD AUTO: 0 % (ref 0.3–6.2)
ERYTHROCYTE [DISTWIDTH] IN BLOOD BY AUTOMATED COUNT: 12.2 % (ref 12.3–15.4)
GLUCOSE SERPL-MCNC: 124 MG/DL (ref 65–99)
HCT VFR BLD AUTO: 36.2 % (ref 34–46.6)
HGB BLD-MCNC: 11.7 G/DL (ref 12–15.9)
IMM GRANULOCYTES # BLD AUTO: 0.04 10*3/MM3 (ref 0–0.05)
IMM GRANULOCYTES NFR BLD AUTO: 0.4 % (ref 0–0.5)
LYMPHOCYTES # BLD AUTO: 1.48 10*3/MM3 (ref 0.7–3.1)
LYMPHOCYTES NFR BLD AUTO: 15.4 % (ref 19.6–45.3)
MAGNESIUM SERPL-MCNC: 2.1 MG/DL (ref 1.6–2.6)
MCH RBC QN AUTO: 29.1 PG (ref 26.6–33)
MCHC RBC AUTO-ENTMCNC: 32.3 G/DL (ref 31.5–35.7)
MCV RBC AUTO: 90 FL (ref 79–97)
MONOCYTES # BLD AUTO: 0.59 10*3/MM3 (ref 0.1–0.9)
MONOCYTES NFR BLD AUTO: 6.2 % (ref 5–12)
NEUTROPHILS NFR BLD AUTO: 7.45 10*3/MM3 (ref 1.7–7)
NEUTROPHILS NFR BLD AUTO: 77.8 % (ref 42.7–76)
NRBC BLD AUTO-RTO: 0 /100 WBC (ref 0–0.2)
PHOSPHATE SERPL-MCNC: 3.9 MG/DL (ref 2.5–4.5)
PLATELET # BLD AUTO: 238 10*3/MM3 (ref 140–450)
PMV BLD AUTO: 10.6 FL (ref 6–12)
POTASSIUM SERPL-SCNC: 3.9 MMOL/L (ref 3.5–5.2)
PROT SERPL-MCNC: 6.3 G/DL (ref 6–8.5)
RBC # BLD AUTO: 4.02 10*6/MM3 (ref 3.77–5.28)
SODIUM SERPL-SCNC: 132 MMOL/L (ref 136–145)
WBC NRBC COR # BLD AUTO: 9.58 10*3/MM3 (ref 3.4–10.8)

## 2025-01-06 PROCEDURE — 83735 ASSAY OF MAGNESIUM: CPT | Performed by: SURGERY

## 2025-01-06 PROCEDURE — 25010000002 HYDROMORPHONE 1 MG/ML SOLUTION: Performed by: SURGERY

## 2025-01-06 PROCEDURE — 99239 HOSP IP/OBS DSCHRG MGMT >30: CPT | Performed by: FAMILY MEDICINE

## 2025-01-06 PROCEDURE — 80048 BASIC METABOLIC PNL TOTAL CA: CPT | Performed by: SURGERY

## 2025-01-06 PROCEDURE — 85025 COMPLETE CBC W/AUTO DIFF WBC: CPT | Performed by: SURGERY

## 2025-01-06 PROCEDURE — 25010000002 ONDANSETRON PER 1 MG: Performed by: SURGERY

## 2025-01-06 PROCEDURE — 25010000002 PIPERACILLIN SOD-TAZOBACTAM PER 1 G: Performed by: SURGERY

## 2025-01-06 PROCEDURE — 25010000002 HEPARIN (PORCINE) PER 1000 UNITS: Performed by: SURGERY

## 2025-01-06 PROCEDURE — 84100 ASSAY OF PHOSPHORUS: CPT | Performed by: SURGERY

## 2025-01-06 PROCEDURE — 99024 POSTOP FOLLOW-UP VISIT: CPT | Performed by: NURSE PRACTITIONER

## 2025-01-06 PROCEDURE — 80076 HEPATIC FUNCTION PANEL: CPT | Performed by: SURGERY

## 2025-01-06 RX ORDER — ONDANSETRON 4 MG/1
4 TABLET, ORALLY DISINTEGRATING ORAL EVERY 6 HOURS PRN
Qty: 20 TABLET | Refills: 0 | Status: SHIPPED | OUTPATIENT
Start: 2025-01-06 | End: 2025-01-11

## 2025-01-06 RX ORDER — POLYETHYLENE GLYCOL 3350 17 G/17G
17 POWDER, FOR SOLUTION ORAL DAILY
Qty: 7 PACKET | Refills: 0 | Status: SHIPPED | OUTPATIENT
Start: 2025-01-06

## 2025-01-06 RX ADMIN — HEPARIN SODIUM 5000 UNITS: 5000 INJECTION INTRAVENOUS; SUBCUTANEOUS at 09:21

## 2025-01-06 RX ADMIN — PIPERACILLIN AND TAZOBACTAM 3.38 G: 3; .375 INJECTION, POWDER, FOR SOLUTION INTRAVENOUS at 07:49

## 2025-01-06 RX ADMIN — ONDANSETRON 4 MG: 2 INJECTION INTRAMUSCULAR; INTRAVENOUS at 04:20

## 2025-01-06 RX ADMIN — HYDROMORPHONE HYDROCHLORIDE 0.5 MG: 1 INJECTION, SOLUTION INTRAMUSCULAR; INTRAVENOUS; SUBCUTANEOUS at 04:20

## 2025-01-06 RX ADMIN — HYDROCODONE BITARTRATE AND ACETAMINOPHEN 1 TABLET: 5; 325 TABLET ORAL at 07:52

## 2025-01-06 NOTE — PLAN OF CARE
Goal Outcome Evaluation:  Plan of Care Reviewed With: patient        Progress: improving  Outcome Evaluation: Pt noted to have bleed through pressure dressing. Surgeon notified @ approx 2020. Surgeon directed nurse to remove dressings and  apply direct pressure to wound. Pressure applied for 8 minutes. Bleeding stopped at that time. Small amt of blood noted on bangage @ approximately 2100. No further bleeding since that time. Pt has had nausea throughout shift. Zofran administered as ordered. Pt requested pain medication 3 times this shift. Administered with good effect. No further c/o's. Pt resting with call light in reach.

## 2025-01-06 NOTE — DISCHARGE SUMMARY
[facility]         HOSPITALIST  DISCHARGE SUMMARY    Patient Name: Marybeth Harrison  : 1975  MRN: [id number]    Date of Admission: 2025  Date of Discharge:  2025    Primary Care Physician: Kamran Ortega MD    Consults       No orders found from 2024 to 2025.            Active and Resolved Hospital Problems:  Active Hospital Problems    Diagnosis POA   • **Abdominal pain [R10.9] Yes   • S/P robotic laparoscopic cholecystectomy [Z90.49] Not Applicable   • Acute cholecystitis [K81.0] Unknown      Resolved Hospital Problems   No resolved problems to display.       Hospital Course     Hospital Course:  49-year-old female with history of migraines, hospitalized on 2025 for chief complaint of abdominal pain, found to have acute cholecystitis, taken to the operating room for robotic cholecystectomy, tolerated procedure well, postoperatively pain was controlled, patient was passing gas and able to urinate without difficulty.  She was discharged in hemodynamically stable condition.  To follow-up with general surgery as outpatient for wound check.    Day of Discharge     Vital Signs:  Temp:  [97.3 °F (36.3 °C)-99 °F (37.2 °C)] 98.4 °F (36.9 °C)  Heart Rate:  [] 87  Resp:  [12-18] 18  BP: (102-153)/() 146/97  Review of systems:  All systems reviewed and negative except for fatigue    Physical exam: Constitutional: Awake, alert, no acute distress lying comfortably   Eyes: Pupils equal, sclerae anicteric, no conjunctival injection   HENT: NCAT, mucous membranes moist   Neck: Supple, no thyromegaly, no lymphadenopathy, trachea midline   Respiratory: Clear to auscultation bilaterally, nonlabored respirations    Cardiovascular: RRR, no murmurs, rubs, or gallops, palpable pedal pulses bilaterally   Gastrointestinal: Positive bowel sounds, soft, nontender, nondistended   Musculoskeletal: No bilateral ankle edema, no clubbing or cyanosis to extremities   Psychiatric:  Appropriate affect, cooperative   Neurologic: Oriented x 3, strength symmetric in all extremities, Cranial Nerves grossly intact to confrontation, speech clear   Skin: No rashes visible on exposed skin      Discharge Details        Discharge Medications        New Medications        Instructions Start Date   HYDROcodone-acetaminophen 5-325 MG per tablet  Commonly known as: Norco   1 tablet, Oral, Every 6 Hours PRN      ondansetron ODT 4 MG disintegrating tablet  Commonly known as: ZOFRAN-ODT   4 mg, Oral, Every 6 Hours PRN      polyethylene glycol 17 g packet  Commonly known as: MIRALAX   17 g, Oral, Daily             Continue These Medications        Instructions Start Date   acetaminophen 500 MG tablet  Commonly known as: TYLENOL   500 mg, Oral, Every 6 Hours PRN      amitriptyline 100 MG tablet  Commonly known as: ELAVIL   Take 1 tablet by mouth At Night As Needed for Sleep.      clonazePAM 1 MG tablet  Commonly known as: KlonoPIN   1 mg, 2 Times Daily PRN      Emgality 120 MG/ML auto-injector pen  Generic drug: galcanezumab-gnlm   120 mg, Subcutaneous, Every 30 Days      SUMAtriptan 5 MG/ACT nasal spray  Commonly known as: IMITREX   Administer 1 spray into the nostril(s) as directed by provider Every 2 (Two) Hours As Needed for Migraine.      vilazodone 40 MG tablet tablet  Commonly known as: VIIBRYD   40 mg, Daily               Allergies   Allergen Reactions   • Trazodone Other (See Comments)     nightmares       Discharge Disposition:  Home or Self Care    Diet:  Hospital:No active diet order      Discharge Activity:   Activity Instructions       Bathing Restrictions      Ok to shower    Type of Restriction: Bathing    Bathing Restrictions: No Tub Bath    Driving Restrictions      Type of Restriction: Driving    Driving Restrictions: No Driving While Taking Narcotics    Lifting Restrictions      Type of Restriction: Lifting    Lifting Restrictions: Lifting Restriction (Indicate Limit)    Weight Limit (Pounds):  15    Length of Lifting Restriction: 2 weeks            CODE STATUS:  Code Status and Medical Interventions: CPR (Attempt to Resuscitate); Full   Ordered at: 01/05/25 1630     Code Status (Patient has no pulse and is not breathing):    CPR (Attempt to Resuscitate)     Medical Interventions (Patient has pulse or is breathing):    Full         Future Appointments   Date Time Provider Department Center   3/19/2025  3:00 PM Jennifer Mendes APRN [facility]       Additional Instructions for the Follow-ups that You Need to Schedule       Discharge Follow-up with PCP   As directed       Currently Documented PCP:    Kamran Ortega MD    PCP Phone Number:    [phone]     Follow Up Details: 3 to 7 days        Discharge Follow-up with Specialty: Dr Costello; 2 Weeks   As directed      Specialty: Dr Costello   Follow Up: 2 Weeks        Discharge Follow-up with Specified Provider: merissa; 2 Weeks   As directed      To: merissa   Follow Up: 2 Weeks                Pertinent  and/or Most Recent Results     PROCEDURES:   MRI abdomen wo contrast mrcp    Result Date: 1/5/2025  MRI ABDOMEN WO CONTRAST MRCP Date of Exam: 1/5/2025 2:45 AM EST Indication: Abnormal CT with findings concerning for cholecystitis. Mild intrahepatic biliary ductal dilatation was present. Epigastric pain.  Comparison: CT abdomen pelvis 1/4/2025 Technique:  Routine multiplanar/multisequence images of the abdomen were obtained with MRCP sequences without contrast administration. Findings: There is some motion degradation. The gallbladder contains small gallstones. There is pericholecystic fluid and inflammatory change. Common bile duct remains normal in caliber measuring about 5 mm on the current study. No definite choledocholithiasis is identified. No significant intrahepatic biliary ductal dilatation is seen currently. No pancreatic ductal dilatation. The kidneys are nonobstructed. There is nothing to suggest acute pancreatitis. The adrenal glands,  spleen, and liver are within normal limits.     1.There is pericholecystic fluid and inflammatory change concerning for acute cholecystitis. Cholelithiasis is present. 2.Common bile duct remains normal in caliber measuring about 5 mm on the current study. No definite choledocholithiasis is identified. No significant intrahepatic biliary ductal dilatation is seen currently. Electronically Signed: Miguel Andrews MD  1/5/2025 3:44 AM EST  Workstation ID: [id number]    CT Abdomen Pelvis With Contrast    Result Date: 1/5/2025  CT ABDOMEN PELVIS W CONTRAST Date of Exam: 1/4/2025 11:39 PM EST Indication: ABDOMINAL PAIN. Comparison: None available. Technique: Axial CT images were obtained of the abdomen and pelvis after the uneventful intravenous administration of iodinated contrast. Reconstructed coronal and sagittal images were also obtained. Automated exposure control and iterative construction methods were used. Findings: Lung bases are clear. Abdominal aorta is normal. The gallbladder is distended, and it contains small stones. There is pericholecystic fluid, and findings are concerning for cholecystitis. Additionally, there is mild intrahepatic biliary ductal dilatation. The common bile duct is not significantly dilated at 8 mm. No definite obstructing stone. The pancreatic duct is normal. There is some focal fatty infiltration in the medial segment of the left hepatic lobe. Solid abdominal organs are otherwise normal. The kidneys are nonobstructed. Urinary bladder is decompressed and not well characterized. There is a 1.5 cm left ovarian cyst. Solid pelvic organs are otherwise normal. The appendix is normal. There is mild colonic diverticulosis without diverticulitis or colitis. No small bowel obstruction is identified. Stomach is distended with food debris and some air but otherwise is normal. No adenopathy. There is a trace amount of dependent free fluid in the pelvis that may be physiologic or reactive.      1.Distended gallbladder containing small stones with pericholecystic fluid. Findings are concerning for cholecystitis. Gallbladder ultrasound should be considered. 2.Mild intrahepatic biliary ductal dilatation. The common bile duct is not significantly dilated at 8 mm. No definite obstructing stone. 3.1.5 cm left ovarian cyst. 4.Trace amount of dependent free fluid in the pelvis that may be physiologic or reactive. 5.Distended stomach. No bowel obstruction. Normal appendix. Electronically Signed: Miguel Andrews MD  1/5/2025 12:24 AM EST  Workstation ID: [id number]       LAB RESULTS:      Lab 01/06/25  0552 01/05/25  0754 01/04/25 2155   WBC 9.58 7.91 10.96*   HEMOGLOBIN 11.7* 11.7* 12.0   HEMATOCRIT 36.2 36.3 37.2   PLATELETS 238 242 269   NEUTROS ABS 7.45* 5.39 8.47*   IMMATURE GRANS (ABS) 0.04 0.07* 0.02   LYMPHS ABS 1.48 1.77 1.73   MONOS ABS 0.59 0.57 0.56   EOS ABS 0.00 0.06 0.13   MCV 90.0 92.1 91.2   PROTIME  --   --  12.9         Lab 01/06/25  0552 01/05/25  0754 01/04/25 2155   SODIUM 132* 134* 139   POTASSIUM 3.9 4.0 3.9   CHLORIDE 101 101 104   CO2 22.3 23.6 26.6   ANION GAP 8.7 9.4 8.4   BUN 4* 5* 7   CREATININE 0.75 0.82 1.07*   EGFR 99.0 88.9 64.6   GLUCOSE 124* 105* 134*   CALCIUM 8.8 9.0 9.1   MAGNESIUM 2.1  --   --    PHOSPHORUS 3.9  --   --          Lab 01/06/25  0552 01/05/25  0754 01/04/25 2155   TOTAL PROTEIN 6.3 6.2 7.0   ALBUMIN 3.5 3.7 4.1   GLOBULIN  --  2.5 2.9   ALT (SGPT) 256* 293* 295*   AST (SGOT) 139* 317* 218*   BILIRUBIN 0.9 1.8* 1.2   INDIRECT BILIRUBIN 0.5  --   --    BILIRUBIN DIRECT 0.4*  --   --    ALK PHOS 209* 213* 199*   LIPASE  --   --  26         Lab 01/04/25 2155   PROTIME 12.9   INR 0.95                 Brief Urine Lab Results  (Last result in the past 365 days)        Color   Clarity   Blood   Leuk Est   Nitrite   Protein   CREAT   Urine HCG        01/04/25 2157 Dark Yellow   Clear   Negative   Negative   Negative   Negative                 Microbiology Results  (last 10 days)       ** No results found for the last 240 hours. **            MRI abdomen wo contrast mrcp    Result Date: 1/5/2025  Impression: 1.There is pericholecystic fluid and inflammatory change concerning for acute cholecystitis. Cholelithiasis is present. 2.Common bile duct remains normal in caliber measuring about 5 mm on the current study. No definite choledocholithiasis is identified. No significant intrahepatic biliary ductal dilatation is seen currently. Electronically Signed: Miguel Andrews MD  1/5/2025 3:44 AM EST  Workstation ID: [id number]    CT Abdomen Pelvis With Contrast    Result Date: 1/5/2025  Impression: 1.Distended gallbladder containing small stones with pericholecystic fluid. Findings are concerning for cholecystitis. Gallbladder ultrasound should be considered. 2.Mild intrahepatic biliary ductal dilatation. The common bile duct is not significantly dilated at 8 mm. No definite obstructing stone. 3.1.5 cm left ovarian cyst. 4.Trace amount of dependent free fluid in the pelvis that may be physiologic or reactive. 5.Distended stomach. No bowel obstruction. Normal appendix. Electronically Signed: Miguel Andrews MD  1/5/2025 12:24 AM EST  Workstation ID: [id number]                 Labs Pending at Discharge:  Pending Labs       Order Current Status    Hepatitis Panel, Acute In process    Tissue Pathology Exam In process              Time spent on Discharge including face to face service:  35 minutes    Electronically signed by Sunday Flaherty MD, 01/06/25, 2:23 PM EST.    Portions of this documentation were transcribed electronically from a voice recognition software.  I confirm all data accurately represents the service(s) I performed at today's visit.

## 2025-01-06 NOTE — PROGRESS NOTES
"POST OP PROGRESS NOTE     Patient Name:  Marybeth Harrison  YOB: 1975  [id number]   LOS: 1 day   1 Day Post-Op            Subjective     Interval History:   VSS, afebrile, pain controlled, tolerating diet    Review of Systems:    A complete review of systems was performed and all are negative except what is documented in the HPI.       Objective     Constitutional:  well nourished, no acute distress, appears stated age /89 (BP Location: Left arm, Patient Position: Lying)   Pulse 93   Temp 98.6 °F (37 °C) (Oral)   Resp 18   Ht 162.6 cm (64\")   Wt 53 kg (116 lb 13.5 oz)   LMP 12/21/2024 (Approximate)   SpO2 97%   BMI 20.06 kg/m²    Eyes:  anicteric sclerae, moist conjunctivae, no lid lag, PERRLA  ENMT:  oropharynx clear, moist mucous membranes, good dentition  Neck:   full ROM, trachea midline  Cardiovascular: RRR, S1 and S2 present, no MRG, heart rate 93, no pedal edema  Respiratory: lungs CTA, respirations even and unlabored  GI:  Abdomen soft, appropriately tender, non distended     Skin:  warm and dry, normal turgor, no rashes  Psychiatric:  alert and oriented x 4, intact judgment and insight, cooperative          Results Review:       I reviewed the patient's new clinical results including  CBC, BMP.     WBC   Date Value Ref Range Status   01/06/2025 9.58 3.40 - 10.80 10*3/mm3 Final     RBC   Date Value Ref Range Status   01/06/2025 4.02 3.77 - 5.28 10*6/mm3 Final     Hemoglobin   Date Value Ref Range Status   01/06/2025 11.7 (L) 12.0 - 15.9 g/dL Final     Hematocrit   Date Value Ref Range Status   01/06/2025 36.2 34.0 - 46.6 % Final     MCV   Date Value Ref Range Status   01/06/2025 90.0 79.0 - 97.0 fL Final     MCH   Date Value Ref Range Status   01/06/2025 29.1 26.6 - 33.0 pg Final     MCHC   Date Value Ref Range Status   01/06/2025 32.3 31.5 - 35.7 g/dL Final     RDW   Date Value Ref Range Status   01/06/2025 12.2 (L) 12.3 - 15.4 % Final     RDW-SD   Date Value Ref Range Status "   01/06/2025 40.0 37.0 - 54.0 fl Final     MPV   Date Value Ref Range Status   01/06/2025 10.6 6.0 - 12.0 fL Final     Platelets   Date Value Ref Range Status   01/06/2025 238 140 - 450 10*3/mm3 Final     Neutrophil %   Date Value Ref Range Status   01/06/2025 77.8 (H) 42.7 - 76.0 % Final     Lymphocyte %   Date Value Ref Range Status   01/06/2025 15.4 (L) 19.6 - 45.3 % Final     Monocyte %   Date Value Ref Range Status   01/06/2025 6.2 5.0 - 12.0 % Final     Eosinophil %   Date Value Ref Range Status   01/06/2025 0.0 (L) 0.3 - 6.2 % Final     Basophil %   Date Value Ref Range Status   01/06/2025 0.2 0.0 - 1.5 % Final     Immature Grans %   Date Value Ref Range Status   01/06/2025 0.4 0.0 - 0.5 % Final     Neutrophils, Absolute   Date Value Ref Range Status   01/06/2025 7.45 (H) 1.70 - 7.00 10*3/mm3 Final     Lymphocytes, Absolute   Date Value Ref Range Status   01/06/2025 1.48 0.70 - 3.10 10*3/mm3 Final     Monocytes, Absolute   Date Value Ref Range Status   01/06/2025 0.59 0.10 - 0.90 10*3/mm3 Final     Eosinophils, Absolute   Date Value Ref Range Status   01/06/2025 0.00 0.00 - 0.40 10*3/mm3 Final     Basophils, Absolute   Date Value Ref Range Status   01/06/2025 0.02 0.00 - 0.20 10*3/mm3 Final     Immature Grans, Absolute   Date Value Ref Range Status   01/06/2025 0.04 0.00 - 0.05 10*3/mm3 Final     nRBC   Date Value Ref Range Status   01/06/2025 0.0 0.0 - 0.2 /100 WBC Final         Basic Metabolic Panel    Sodium Sodium   Date Value Ref Range Status   01/06/2025 132 (L) 136 - 145 mmol/L Final   01/05/2025 134 (L) 136 - 145 mmol/L Final   01/04/2025 139 136 - 145 mmol/L Final      Potassium Potassium   Date Value Ref Range Status   01/06/2025 3.9 3.5 - 5.2 mmol/L Final   01/05/2025 4.0 3.5 - 5.2 mmol/L Final   01/04/2025 3.9 3.5 - 5.2 mmol/L Final      Chloride Chloride   Date Value Ref Range Status   01/06/2025 101 98 - 107 mmol/L Final   01/05/2025 101 98 - 107 mmol/L Final   01/04/2025 104 98 - 107 mmol/L  "Final      Bicarbonate No results found for: \"PLASMABICARB\"   BUN BUN   Date Value Ref Range Status   01/06/2025 4 (L) 6 - 20 mg/dL Final   01/05/2025 5 (L) 6 - 20 mg/dL Final   01/04/2025 7 6 - 20 mg/dL Final      Creatinine Creatinine   Date Value Ref Range Status   01/06/2025 0.75 0.57 - 1.00 mg/dL Final   01/05/2025 0.82 0.57 - 1.00 mg/dL Final   01/04/2025 1.07 (H) 0.57 - 1.00 mg/dL Final      Calcium Calcium   Date Value Ref Range Status   01/06/2025 8.8 8.6 - 10.5 mg/dL Final   01/05/2025 9.0 8.6 - 10.5 mg/dL Final   01/04/2025 9.1 8.6 - 10.5 mg/dL Final      Glucose      No components found for: \"GLUCOSE.*\"       Lab Results   Component Value Date    GLUCOSE 124 (H) 01/06/2025    BUN 4 (L) 01/06/2025    CREATININE 0.75 01/06/2025     (L) 01/06/2025    K 3.9 01/06/2025     01/06/2025    CALCIUM 8.8 01/06/2025    PROTEINTOT 6.3 01/06/2025    ALBUMIN 3.5 01/06/2025     (H) 01/06/2025     (H) 01/06/2025    ALKPHOS 209 (H) 01/06/2025    BILITOT 0.9 01/06/2025    GLOB 2.5 01/05/2025    AGRATIO 1.5 01/05/2025    BCR 5.3 (L) 01/06/2025    ANIONGAP 8.7 01/06/2025    EGFR 99.0 01/06/2025       IMAGING:        Medications:    Current Facility-Administered Medications:     acetaminophen (TYLENOL) tablet 650 mg, 650 mg, Oral, Q4H PRN **OR** acetaminophen (TYLENOL) suppository 650 mg, 650 mg, Rectal, Q4H PRN, Giovani Costello MD    amitriptyline (ELAVIL) tablet 100 mg, 100 mg, Oral, Nightly PRN, Todd Rivera MD    sennosides-docusate (PERICOLACE) 8.6-50 MG per tablet 2 tablet, 2 tablet, Oral, BID PRN **AND** polyethylene glycol (MIRALAX) packet 17 g, 17 g, Oral, Daily PRN **AND** bisacodyl (DULCOLAX) EC tablet 5 mg, 5 mg, Oral, Daily PRN **AND** bisacodyl (DULCOLAX) suppository 10 mg, 10 mg, Rectal, Daily PRN, Giovani Costello MD    clonazePAM (KlonoPIN) tablet 0.5 mg, 0.5 mg, Oral, BID PRN, Todd Rivera MD, 0.5 mg at 01/05/25 2156    docusate sodium (COLACE) capsule 100 mg, 100 mg, Oral, BID " PRN, Giovani Costello MD    heparin (porcine) 5000 UNIT/ML injection 5,000 Units, 5,000 Units, Subcutaneous, Q12H, Giovani Costello MD, 5,000 Units at 01/06/25 0921    HYDROcodone-acetaminophen (NORCO) 5-325 MG per tablet 1 tablet, 1 tablet, Oral, Q4H PRN, Giovani Costello MD, 1 tablet at 01/06/25 0752    HYDROmorphone (DILAUDID) injection 0.5 mg, 0.5 mg, Intravenous, Q2H PRN, 0.5 mg at 01/06/25 0420 **AND** naloxone (NARCAN) injection 0.4 mg, 0.4 mg, Intravenous, Q5 Min PRN, Giovani Costello MD    morphine injection 4 mg, 4 mg, Intravenous, Q2H PRN **AND** naloxone (NARCAN) injection 0.4 mg, 0.4 mg, Intravenous, Q5 Min PRN, Giovani Costello MD    ondansetron ODT (ZOFRAN-ODT) disintegrating tablet 4 mg, 4 mg, Oral, Q6H PRN **OR** ondansetron (ZOFRAN) injection 4 mg, 4 mg, Intravenous, Q6H PRN, Giovani Costello MD, 4 mg at 01/06/25 0420    piperacillin-tazobactam (ZOSYN) IVPB 3.375 g IVPB in 100 mL NS (VTB), 3.375 g, Intravenous, Q8H, Giovani Costello MD, 3.375 g at 01/06/25 0749    scopolamine patch 1 mg/72 hr, 1 patch, Transdermal, Continuous, Yaniv Luciano MD    sodium chloride 0.9 % flush 10 mL, 10 mL, Intravenous, PRN, Giovani Costello MD    sodium chloride 0.9 % flush 10 mL, 10 mL, Intravenous, Q12H, Giovani Costello MD, 10 mL at 01/05/25 2100    sodium chloride 0.9 % flush 10 mL, 10 mL, Intravenous, PRN, Giovani Costello MD    sodium chloride 0.9 % flush 10 mL, 10 mL, Intravenous, Q12H, Giovani Costello MD, 10 mL at 01/05/25 2100    sodium chloride 0.9 % flush 10 mL, 10 mL, Intravenous, PRN, Giovani Costello MD    sodium chloride 0.9 % infusion 40 mL, 40 mL, Intravenous, PRN, Giovani Costello MD    sodium chloride 0.9 % infusion 40 mL, 40 mL, Intravenous, PRN, Giovani Costello MD    vilazodone (VIIBRYD) tablet 40 mg, 40 mg, Oral, Daily, Todd Rivera MD, 40 mg at 01/05/25 5412    Assessment & Plan       Abdominal pain    Acute cholecystitis    S/P robotic laparoscopic cholecystectomy     OK to  SOFIA home    Follow up in 2 weeks              Electronically signed by Carmela Quintanilla, PADMINI, 01/06/25, 9:43 AM EST.

## 2025-01-07 LAB
CYTO UR: NORMAL
HAV IGM SERPL QL IA: NORMAL
HBV CORE IGM SERPL QL IA: NORMAL
HBV SURFACE AG SERPL QL IA: NORMAL
HCV AB SER QL: NORMAL
LAB AP CASE REPORT: NORMAL
LAB AP CLINICAL INFORMATION: NORMAL
PATH REPORT.FINAL DX SPEC: NORMAL
PATH REPORT.GROSS SPEC: NORMAL

## 2025-01-20 ENCOUNTER — OFFICE VISIT (OUTPATIENT)
Dept: SURGERY | Facility: CLINIC | Age: 50
End: 2025-01-20
Payer: COMMERCIAL

## 2025-01-20 VITALS — HEIGHT: 64 IN | WEIGHT: 116 LBS | BODY MASS INDEX: 19.81 KG/M2

## 2025-01-20 DIAGNOSIS — Z90.49 S/P LAPAROSCOPIC CHOLECYSTECTOMY: Primary | ICD-10-CM

## 2025-01-20 PROCEDURE — 99024 POSTOP FOLLOW-UP VISIT: CPT | Performed by: SURGERY

## 2025-01-20 NOTE — LETTER
January 20, 2025     Kamran Ortega MD  [address] [address]    Patient: Marybeth Harrison   YOB: 1975   Date of Visit: 1/20/2025     Dear Kamran Ortega MD:       Thank you for referring Marybeth Harrison to me for evaluation. Below are the relevant portions of my assessment and plan of care.    If you have questions, please do not hesitate to call me. I look forward to following Marybeth along with you.         Sincerely,        Giovani Costello MD        CC: No Recipients    Giovani Costello MD  01/20/25 3690  Sign when Signing Visit  Chief Complaint  Post-op Follow-up (Cholecystectomy)    Subjective         Marybeth Harrison presents to [facility]  History of Present Illness    Marybeth Harrison is a 49 y.o. female  who presents today for a postoperative visit.     Patient is here for a follow-up after a recent robotic cholecystectomy for gallstones.  She is doing much better now.  She is eating well and going to the bathroom fine.    Past History:  Medical History: has a past medical history of Cluster headache (1992), Headache, tension-type (1992), Insomnia, Migraines, Panic disorder, and S/P robotic laparoscopic cholecystectomy (01/06/2025).   Surgical History: has a past surgical history that includes Colonoscopy (2016); Septoplasty; Nasal turbinate reduction; and Cholecystectomy (N/A, 1/5/2025).   Family History: family history is not on file.   Social History: reports that she has never smoked. She has never used smokeless tobacco. She reports current alcohol use. She reports that she does not use drugs.  Allergies: Trazodone       Current Outpatient Medications:   •  amitriptyline (ELAVIL) 100 MG tablet, Take 1 tablet by mouth At Night As Needed for Sleep., Disp: , Rfl:   •  clonazePAM (KlonoPIN) 1 MG tablet, Take 1 tablet by mouth 2 (Two) Times a Day As Needed., Disp: , Rfl:   •  galcanezumab-gnlm (Emgality) 120 MG/ML  "auto-injector pen, Inject 1 mL under the skin into the appropriate area as directed Every 30 (Thirty) Days., Disp: 3 each, Rfl: 3  •  polyethylene glycol (MIRALAX) 17 g packet, Take 17 g by mouth Daily., Disp: 7 packet, Rfl: 0  •  SUMAtriptan (IMITREX) 5 MG/ACT nasal spray, Administer 1 spray into the nostril(s) as directed by provider Every 2 (Two) Hours As Needed for Migraine., Disp: , Rfl:   •  vilazodone (VIIBRYD) 40 MG tablet tablet, Take 1 tablet by mouth Daily., Disp: , Rfl:   •  acetaminophen (TYLENOL) 500 MG tablet, Take 1 tablet by mouth Every 6 (Six) Hours As Needed for Mild Pain., Disp: 30 tablet, Rfl: 0  •  HYDROcodone-acetaminophen (Norco) 5-325 MG per tablet, Take 1 tablet by mouth Every 6 (Six) Hours As Needed for Moderate Pain., Disp: 10 tablet, Rfl: 0       Physical Exam  Her incisions look good.  Her abdomen is soft.  Objective    Vital Signs:   Ht 162.6 cm (64\")   Wt 52.6 kg (116 lb)   BMI 19.91 kg/m²              Assessment and Plan    Diagnoses and all orders for this visit:    1. S/P robotic laparoscopic cholecystectomy (Primary)    I will see her back on an as-needed basis.  I have asked her to call me if she were to have any further questions or concerns.      "

## 2025-04-16 ENCOUNTER — TELEPHONE (OUTPATIENT)
Dept: OBSTETRICS AND GYNECOLOGY | Age: 50
End: 2025-04-16
Payer: COMMERCIAL

## 2025-06-12 ENCOUNTER — TRANSCRIBE ORDERS (OUTPATIENT)
Dept: ADMINISTRATIVE | Facility: HOSPITAL | Age: 50
End: 2025-06-12
Payer: COMMERCIAL

## 2025-06-12 DIAGNOSIS — Z12.31 OTHER SCREENING MAMMOGRAM: Primary | ICD-10-CM

## (undated) DEVICE — FLTR SMOKE EVAC SEECLEAR CHARCOAL 8L/MIN

## (undated) DEVICE — GLV SURG SENSICARE PI ORTHO SZ8 LF STRL

## (undated) DEVICE — SEAL

## (undated) DEVICE — DAVINCI-LF: Brand: MEDLINE INDUSTRIES, INC.

## (undated) DEVICE — NON-WOVEN ADHESIVE WOUND DRESSING: Brand: PRIMAPORE ADHESIVE WOUND DRSG 7.2*5CM

## (undated) DEVICE — SHEET,DRAPE,70X85,STERILE: Brand: MEDLINE

## (undated) DEVICE — STERILE POLYISOPRENE POWDER-FREE SURGICAL GLOVES: Brand: PROTEXIS

## (undated) DEVICE — TISSUE RETRIEVAL SYSTEM: Brand: INZII RETRIEVAL SYSTEM

## (undated) DEVICE — REDUCER: Brand: ENDOWRIST

## (undated) DEVICE — SUT MERSILENE POLYSTR CT1 BR 0 75CM GRN

## (undated) DEVICE — ANTIBACTERIAL UNDYED BRAIDED (POLYGLACTIN 910), SYNTHETIC ABSORBABLE SUTURE: Brand: COATED VICRYL

## (undated) DEVICE — SOL NACL 0.9PCT 1000ML

## (undated) DEVICE — SOL IRR NACL 0.9PCT 1000ML

## (undated) DEVICE — SLV SCD KN/LEN ADJ EXPRSS BLENDED MD 1P/U

## (undated) DEVICE — SYR LL TP 10ML STRL

## (undated) DEVICE — ARM DRAPE

## (undated) DEVICE — INTENDED FOR TISSUE SEPARATION, AND OTHER PROCEDURES THAT REQUIRE A SHARP SURGICAL BLADE TO PUNCTURE OR CUT.: Brand: BARD-PARKER ® CARBON RIB-BACK BLADES

## (undated) DEVICE — SYS CLOSE PORTII CARTR/THOMASN XL

## (undated) DEVICE — ENDOPATH XCEL WITH OPTIVIEW TECHNOLOGY BLADELESS TROCARS WITH STABILITY SLEEVES: Brand: ENDOPATH XCEL OPTIVIEW

## (undated) DEVICE — PENCL ES MEGADINE EZ/CLEAN BUTN W/HOLSTR 10FT

## (undated) DEVICE — LAPAROVUE VISIBILITY SYSTEM LAPAROSCOPIC SOLUTIONS: Brand: LAPAROVUE

## (undated) DEVICE — STRIP CLS WND SUTURESTRIP/PLS 0.5X4IN TP1103